# Patient Record
Sex: FEMALE | Race: WHITE | Employment: OTHER | URBAN - METROPOLITAN AREA
[De-identification: names, ages, dates, MRNs, and addresses within clinical notes are randomized per-mention and may not be internally consistent; named-entity substitution may affect disease eponyms.]

---

## 2017-07-03 ENCOUNTER — HOSPITAL ENCOUNTER (OUTPATIENT)
Dept: PULMONOLOGY | Age: 72
Discharge: HOME | End: 2017-07-03
Attending: FAMILY MEDICINE | Admitting: FAMILY MEDICINE

## 2017-07-10 PROBLEM — I27.20 MILD PULMONARY HYPERTENSION (HCC): Status: ACTIVE | Noted: 2017-07-10

## 2017-07-18 ENCOUNTER — HOSPITAL ENCOUNTER (OUTPATIENT)
Dept: LAB | Age: 72
Discharge: HOME | End: 2017-07-18
Attending: INTERNAL MEDICINE | Admitting: INTERNAL MEDICINE

## 2017-07-18 LAB
ANION GAP SERPL CALCULATED.3IONS-SCNC: 14 MMOL/L (ref 4–16)
APTT: 34.4 SECONDS (ref 21.2–33)
BASOPHILS ABSOLUTE: 0.1 K/CU MM
BASOPHILS RELATIVE PERCENT: 1.5 % (ref 0–1)
BUN BLDV-MCNC: 9 MG/DL (ref 6–23)
CALCIUM SERPL-MCNC: 9.5 MG/DL (ref 8.3–10.6)
CHLORIDE BLD-SCNC: 102 MMOL/L (ref 99–110)
CO2: 27 MMOL/L (ref 21–32)
CREAT SERPL-MCNC: 1 MG/DL (ref 0.6–1.1)
DIFFERENTIAL TYPE: ABNORMAL
EOSINOPHILS ABSOLUTE: 0.2 K/CU MM
EOSINOPHILS RELATIVE PERCENT: 3 % (ref 0–3)
GFR AFRICAN AMERICAN: >60 ML/MIN/1.73M2
GFR NON-AFRICAN AMERICAN: 55 ML/MIN/1.73M2
GLUCOSE BLD-MCNC: 180 MG/DL (ref 70–140)
HCT VFR BLD CALC: 41.6 % (ref 37–47)
HEMOGLOBIN: 14.4 GM/DL (ref 12.5–16)
IMMATURE NEUTROPHIL %: 0.9 % (ref 0–0.43)
INR BLD: 1.35 INDEX
LYMPHOCYTES ABSOLUTE: 1.6 K/CU MM
LYMPHOCYTES RELATIVE PERCENT: 19.3 % (ref 24–44)
MCH RBC QN AUTO: 32 PG (ref 27–31)
MCHC RBC AUTO-ENTMCNC: 34.6 % (ref 32–36)
MCV RBC AUTO: 92.4 FL (ref 78–100)
MONOCYTES ABSOLUTE: 0.6 K/CU MM
MONOCYTES RELATIVE PERCENT: 7.3 % (ref 0–4)
NUCLEATED RBC %: 0 %
PDW BLD-RTO: 12.9 % (ref 11.7–14.9)
PLATELET # BLD: 211 K/CU MM (ref 140–440)
PMV BLD AUTO: 9.7 FL (ref 7.5–11.1)
POTASSIUM SERPL-SCNC: 3.4 MMOL/L (ref 3.5–5.1)
PROTHROMBIN TIME: 15.5 SECONDS (ref 9.12–12.5)
RBC # BLD: 4.5 M/CU MM (ref 4.2–5.4)
SEGMENTED NEUTROPHILS ABSOLUTE COUNT: 5.5 K/CU MM
SEGMENTED NEUTROPHILS RELATIVE PERCENT: 68 % (ref 36–66)
SODIUM BLD-SCNC: 143 MMOL/L (ref 135–145)
TOTAL IMMATURE NEUTOROPHIL: 0.07 K/CU MM
TOTAL NUCLEATED RBC: 0 K/CU MM
WBC # BLD: 8 K/CU MM (ref 4–10.5)

## 2017-08-01 ENCOUNTER — HOSPITAL ENCOUNTER (OUTPATIENT)
Dept: OTHER | Age: 72
Discharge: HOME | End: 2017-08-01
Attending: INTERNAL MEDICINE | Admitting: INTERNAL MEDICINE

## 2017-08-04 LAB
ANION GAP SERPL CALCULATED.3IONS-SCNC: 12 MMOL/L (ref 4–16)
BUN BLDV-MCNC: 9 MG/DL (ref 6–23)
CALCIUM SERPL-MCNC: 8.9 MG/DL (ref 8.3–10.6)
CHLORIDE BLD-SCNC: 97 MMOL/L (ref 99–110)
CO2: 32 MMOL/L (ref 21–32)
CREAT SERPL-MCNC: 0.8 MG/DL (ref 0.6–1.1)
GFR AFRICAN AMERICAN: >60 ML/MIN/1.73M2
GFR NON-AFRICAN AMERICAN: >60 ML/MIN/1.73M2
GLUCOSE BLD-MCNC: 97 MG/DL (ref 70–140)
HCT VFR BLD CALC: 32.1 % (ref 37–47)
HEMOGLOBIN: 10.3 GM/DL (ref 12.5–16)
MCH RBC QN AUTO: 31.1 PG (ref 27–31)
MCHC RBC AUTO-ENTMCNC: 32.1 % (ref 32–36)
MCV RBC AUTO: 97 FL (ref 78–100)
PDW BLD-RTO: 14 % (ref 11.7–14.9)
PLATELET # BLD: 301 K/CU MM (ref 140–440)
PMV BLD AUTO: 9.7 FL (ref 7.5–11.1)
POTASSIUM SERPL-SCNC: 3.2 MMOL/L (ref 3.5–5.1)
PRO-BNP: 2642 PG/ML
RBC # BLD: 3.31 M/CU MM (ref 4.2–5.4)
SODIUM BLD-SCNC: 141 MMOL/L (ref 135–145)
TSH HIGH SENSITIVITY: 0.92 UIU/ML (ref 0.27–4.2)
WBC # BLD: 9 K/CU MM (ref 4–10.5)

## 2017-08-08 LAB
ANION GAP SERPL CALCULATED.3IONS-SCNC: 14 MMOL/L (ref 4–16)
BUN BLDV-MCNC: 6 MG/DL (ref 6–23)
CALCIUM SERPL-MCNC: 9 MG/DL (ref 8.3–10.6)
CHLORIDE BLD-SCNC: 98 MMOL/L (ref 99–110)
CO2: 28 MMOL/L (ref 21–32)
CREAT SERPL-MCNC: 0.8 MG/DL (ref 0.6–1.1)
GFR AFRICAN AMERICAN: >60 ML/MIN/1.73M2
GFR NON-AFRICAN AMERICAN: >60 ML/MIN/1.73M2
GLUCOSE BLD-MCNC: 112 MG/DL (ref 70–140)
POTASSIUM SERPL-SCNC: 3.8 MMOL/L (ref 3.5–5.1)
SODIUM BLD-SCNC: 140 MMOL/L (ref 135–145)

## 2017-08-11 LAB
ANION GAP SERPL CALCULATED.3IONS-SCNC: 15 MMOL/L (ref 4–16)
BUN BLDV-MCNC: 6 MG/DL (ref 6–23)
CALCIUM SERPL-MCNC: 8.6 MG/DL (ref 8.3–10.6)
CHLORIDE BLD-SCNC: 100 MMOL/L (ref 99–110)
CO2: 26 MMOL/L (ref 21–32)
CREAT SERPL-MCNC: 0.8 MG/DL (ref 0.6–1.1)
GFR AFRICAN AMERICAN: >60 ML/MIN/1.73M2
GFR NON-AFRICAN AMERICAN: >60 ML/MIN/1.73M2
GLUCOSE BLD-MCNC: 75 MG/DL (ref 70–140)
POTASSIUM SERPL-SCNC: 3.4 MMOL/L (ref 3.5–5.1)
PRO-BNP: 3081 PG/ML
SODIUM BLD-SCNC: 141 MMOL/L (ref 135–145)

## 2017-08-15 LAB
ANION GAP SERPL CALCULATED.3IONS-SCNC: 12 MMOL/L (ref 4–16)
BUN BLDV-MCNC: 8 MG/DL (ref 6–23)
CALCIUM SERPL-MCNC: 8.9 MG/DL (ref 8.3–10.6)
CHLORIDE BLD-SCNC: 98 MMOL/L (ref 99–110)
CO2: 30 MMOL/L (ref 21–32)
CREAT SERPL-MCNC: 0.9 MG/DL (ref 0.6–1.1)
GFR AFRICAN AMERICAN: >60 ML/MIN/1.73M2
GFR NON-AFRICAN AMERICAN: >60 ML/MIN/1.73M2
GLUCOSE BLD-MCNC: 65 MG/DL (ref 70–140)
HCT VFR BLD CALC: 34.2 % (ref 37–47)
HEMOGLOBIN: 10.9 GM/DL (ref 12.5–16)
MCH RBC QN AUTO: 30.4 PG (ref 27–31)
MCHC RBC AUTO-ENTMCNC: 31.9 % (ref 32–36)
MCV RBC AUTO: 95.3 FL (ref 78–100)
PDW BLD-RTO: 13.8 % (ref 11.7–14.9)
PLATELET # BLD: 225 K/CU MM (ref 140–440)
PMV BLD AUTO: 10 FL (ref 7.5–11.1)
POTASSIUM SERPL-SCNC: 3.8 MMOL/L (ref 3.5–5.1)
RBC # BLD: 3.59 M/CU MM (ref 4.2–5.4)
SODIUM BLD-SCNC: 140 MMOL/L (ref 135–145)
WBC # BLD: 6.2 K/CU MM (ref 4–10.5)

## 2017-08-17 PROBLEM — I25.118 CORONARY ARTERY DISEASE OF NATIVE ARTERY OF NATIVE HEART WITH STABLE ANGINA PECTORIS (HCC): Status: ACTIVE | Noted: 2017-08-17

## 2017-08-17 PROBLEM — I34.0 NON-RHEUMATIC MITRAL REGURGITATION: Status: ACTIVE | Noted: 2017-08-17

## 2017-08-22 LAB
ANION GAP SERPL CALCULATED.3IONS-SCNC: 12 MMOL/L (ref 4–16)
BUN BLDV-MCNC: 10 MG/DL (ref 6–23)
CALCIUM SERPL-MCNC: 8.8 MG/DL (ref 8.3–10.6)
CHLORIDE BLD-SCNC: 98 MMOL/L (ref 99–110)
CO2: 27 MMOL/L (ref 21–32)
CREAT SERPL-MCNC: 0.9 MG/DL (ref 0.6–1.1)
GFR AFRICAN AMERICAN: >60 ML/MIN/1.73M2
GFR NON-AFRICAN AMERICAN: >60 ML/MIN/1.73M2
GLUCOSE BLD-MCNC: 83 MG/DL (ref 70–140)
POTASSIUM SERPL-SCNC: 4 MMOL/L (ref 3.5–5.1)
PRO-BNP: 1588 PG/ML
SODIUM BLD-SCNC: 137 MMOL/L (ref 135–145)

## 2017-09-07 ENCOUNTER — HOSPITAL ENCOUNTER (OUTPATIENT)
Dept: GENERAL RADIOLOGY | Age: 72
Discharge: HOME | End: 2017-09-07
Attending: SURGERY | Admitting: SURGERY

## 2017-09-07 DIAGNOSIS — I50.42 CHRONIC COMBINED SYSTOLIC AND DIASTOLIC CONGESTIVE HEART FAILURE (HCC): ICD-10-CM

## 2017-09-25 ENCOUNTER — HOSPITAL ENCOUNTER (OUTPATIENT)
Dept: GENERAL RADIOLOGY | Age: 72
Discharge: HOME | End: 2017-09-25
Attending: INTERNAL MEDICINE | Admitting: INTERNAL MEDICINE

## 2017-09-25 LAB
ANION GAP SERPL CALCULATED.3IONS-SCNC: 18 MMOL/L (ref 4–16)
BACTERIA: ABNORMAL /HPF
BILIRUBIN URINE: NEGATIVE MG/DL
BLOOD, URINE: ABNORMAL
BUN BLDV-MCNC: 9 MG/DL (ref 6–23)
CALCIUM OXALATE CRYSTALS: ABNORMAL /HPF
CALCIUM SERPL-MCNC: 9.6 MG/DL (ref 8.3–10.6)
CHLORIDE BLD-SCNC: 96 MMOL/L (ref 99–110)
CLARITY: ABNORMAL
CO2: 27 MMOL/L (ref 21–32)
COLOR: ABNORMAL
CREAT SERPL-MCNC: 1.1 MG/DL (ref 0.6–1.1)
GFR AFRICAN AMERICAN: 59 ML/MIN/1.73M2
GFR NON-AFRICAN AMERICAN: 49 ML/MIN/1.73M2
GLUCOSE BLD-MCNC: 108 MG/DL (ref 70–140)
GLUCOSE, URINE: NEGATIVE MG/DL
HYALINE CASTS: >20 /LPF
KETONES, URINE: NEGATIVE MG/DL
LEUKOCYTE ESTERASE, URINE: ABNORMAL
MAGNESIUM: 1.8 MG/DL (ref 1.8–2.4)
MUCUS: ABNORMAL HPF
NITRITE URINE, QUANTITATIVE: NEGATIVE
PH, URINE: 5 (ref 5–8)
POTASSIUM SERPL-SCNC: 3.6 MMOL/L (ref 3.5–5.1)
PROTEIN UA: 30 MG/DL
RBC URINE: 5 /HPF (ref 0–6)
SODIUM BLD-SCNC: 141 MMOL/L (ref 135–145)
SPECIFIC GRAVITY UA: 1.02 (ref 1–1.03)
SQUAMOUS EPITHELIAL: 1 /HPF
TRANSITIONAL EPITHELIAL: <1 /HPF
TRICHOMONAS: ABNORMAL /HPF
UROBILINOGEN, URINE: NORMAL MG/DL (ref 0.2–1)
WBC CLUMP: ABNORMAL /HPF
WBC UA: 68 /HPF (ref 0–5)

## 2017-10-06 ENCOUNTER — HOSPITAL ENCOUNTER (OUTPATIENT)
Dept: GENERAL RADIOLOGY | Age: 72
Discharge: HOME | End: 2017-10-06
Attending: SURGERY | Admitting: SURGERY

## 2017-10-06 DIAGNOSIS — J90 PLEURAL EFFUSION: ICD-10-CM

## 2017-10-30 ENCOUNTER — HOSPITAL ENCOUNTER (OUTPATIENT)
Dept: GENERAL RADIOLOGY | Age: 72
Discharge: HOME | End: 2017-10-30
Attending: THORACIC SURGERY (CARDIOTHORACIC VASCULAR SURGERY) | Admitting: THORACIC SURGERY (CARDIOTHORACIC VASCULAR SURGERY)

## 2017-10-30 DIAGNOSIS — J44.9 COPD, MODERATE (HCC): ICD-10-CM

## 2017-11-15 PROBLEM — K74.69 OTHER CIRRHOSIS OF LIVER (HCC): Status: ACTIVE | Noted: 2017-11-15

## 2017-11-21 ENCOUNTER — TELEPHONE (OUTPATIENT)
Dept: GASTROENTEROLOGY | Age: 72
End: 2017-11-21

## 2017-11-21 NOTE — TELEPHONE ENCOUNTER
Received phone call from Dr Bj Arango requesting that we cancel her EGD that was ordered specifically to check for varices as CT had suggested cirrhosis.  Will cancel that for now and he will call back to reschedule when and if appropriate

## 2017-11-27 ENCOUNTER — HOSPITAL ENCOUNTER (OUTPATIENT)
Dept: GENERAL RADIOLOGY | Age: 72
Discharge: HOME | End: 2017-11-27
Attending: FAMILY MEDICINE | Admitting: FAMILY MEDICINE

## 2017-11-27 ENCOUNTER — HOSPITAL ENCOUNTER (OUTPATIENT)
Dept: GENERAL RADIOLOGY | Age: 72
Discharge: HOME | End: 2017-11-27
Attending: SURGERY | Admitting: SURGERY

## 2017-11-27 DIAGNOSIS — J90 PLEURAL EFFUSION: ICD-10-CM

## 2017-11-27 LAB
ALBUMIN SERPL-MCNC: 3.5 GM/DL (ref 3.4–5)
ALP BLD-CCNC: 94 IU/L (ref 40–128)
ALT SERPL-CCNC: 15 U/L (ref 10–40)
ANION GAP SERPL CALCULATED.3IONS-SCNC: 13 MMOL/L (ref 4–16)
AST SERPL-CCNC: 14 IU/L (ref 15–37)
BASOPHILS ABSOLUTE: 0.1 K/CU MM
BASOPHILS RELATIVE PERCENT: 0.5 % (ref 0–1)
BILIRUB SERPL-MCNC: 1.4 MG/DL (ref 0–1)
BUN BLDV-MCNC: 9 MG/DL (ref 6–23)
CALCIUM SERPL-MCNC: 9.2 MG/DL (ref 8.3–10.6)
CHLORIDE BLD-SCNC: 100 MMOL/L (ref 99–110)
CO2: 27 MMOL/L (ref 21–32)
CREAT SERPL-MCNC: 0.9 MG/DL (ref 0.6–1.1)
DIFFERENTIAL TYPE: ABNORMAL
EOSINOPHILS ABSOLUTE: 0.1 K/CU MM
EOSINOPHILS RELATIVE PERCENT: 0.6 % (ref 0–3)
GFR AFRICAN AMERICAN: >60 ML/MIN/1.73M2
GFR NON-AFRICAN AMERICAN: >60 ML/MIN/1.73M2
GLUCOSE BLD-MCNC: 167 MG/DL (ref 70–99)
HCT VFR BLD CALC: 42.3 % (ref 37–47)
HEMOGLOBIN: 13.8 GM/DL (ref 12.5–16)
IMMATURE NEUTROPHIL %: 2.1 % (ref 0–0.43)
LYMPHOCYTES ABSOLUTE: 1.2 K/CU MM
LYMPHOCYTES RELATIVE PERCENT: 9.5 % (ref 24–44)
MCH RBC QN AUTO: 29.3 PG (ref 27–31)
MCHC RBC AUTO-ENTMCNC: 32.6 % (ref 32–36)
MCV RBC AUTO: 89.8 FL (ref 78–100)
MONOCYTES ABSOLUTE: 0.6 K/CU MM
MONOCYTES RELATIVE PERCENT: 4.3 % (ref 0–4)
NUCLEATED RBC %: 0 %
PDW BLD-RTO: 16.3 % (ref 11.7–14.9)
PLATELET # BLD: 219 K/CU MM (ref 140–440)
PMV BLD AUTO: 10.2 FL (ref 7.5–11.1)
POTASSIUM SERPL-SCNC: 3.6 MMOL/L (ref 3.5–5.1)
RBC # BLD: 4.71 M/CU MM (ref 4.2–5.4)
SEGMENTED NEUTROPHILS ABSOLUTE COUNT: 10.8 K/CU MM
SEGMENTED NEUTROPHILS RELATIVE PERCENT: 83 % (ref 36–66)
SODIUM BLD-SCNC: 140 MMOL/L (ref 135–145)
TOTAL IMMATURE NEUTOROPHIL: 0.27 K/CU MM
TOTAL NUCLEATED RBC: 0 K/CU MM
TOTAL PROTEIN: 5.7 GM/DL (ref 6.4–8.2)
WBC # BLD: 13 K/CU MM (ref 4–10.5)

## 2017-12-01 NOTE — ANESTHESIA PRE-OP
artery, which she claims has for longtime  Followed by Dr. Stiven Baldwin:  neg    BREAST/GYN:  Neg     HEMATOLOGIC/LYMPHATIC: neg    ENDOCRINE: DM, last A1c 5.1, 10/2017. Thyroid dz, on replacement. MUSCULOSKELETAL: gout on colchicine    NEUROLOGICAL:  Hypoglycemic seizure x 1, 2000's. BEHAVIOR/PSYCH:   Depression and anxiety  Alert and oriented x 4. Questions answered. Understanding verbalized    PHYSICAL EXAM:  Airway Exam:  Head/Neck movement: full  Thyromental Distance: > 3 FB  History of difficult intubation:  None  Mallampati Classification:  Class II  Teeth: upper dentures. 5 lower, loose two left front. LUNGS:  Diminished BS bilaterally, no crackles or wheezing  CARDIOVASCULAR:  Normal apical impulse, regular rate and rhythm, normal S1 and S2, no S3 or S4, and no murmur noted    Testing Results:    EKG:  Atrial fibrillation, controlled rate 71  Nonspecific T wave abnormality   Abnormal ECG   When compared with ECG of 11-NOV-2017 09:13,   Vent. rate has decreased BY  38 BPM   QT has lengthened     LABS:    CBC  Lab Results   Component Value Date/Time    WBC 6.9 12/04/2017 10:00 AM    HGB 12.2 (L) 12/04/2017 10:00 AM    HCT 38.0 12/04/2017 10:00 AM     12/04/2017 10:00 AM     RENAL  Lab Results   Component Value Date/Time     (L) 12/04/2017 10:00 AM    K 3.6 12/04/2017 10:00 AM    CL 95 (L) 12/04/2017 10:00 AM    CO2 29 12/04/2017 10:00 AM    BUN 7 12/04/2017 10:00 AM    CREATININE 0.9 12/04/2017 10:00 AM    GLUCOSE 204 (H) 12/04/2017 10:00 AM     COAGS  Lab Results   Component Value Date/Time    PROTIME 12.8 (H) 12/04/2017 10:00 AM    INR 1.12 12/04/2017 10:00 AM    APTT 33.6 (H) 12/04/2017 10:00 AM     Summary:  Chart reviewed, pt. Interviewed and examined. Planned surgical procedure:  Left  VATS pleurodesis both mechanical and talc per Dr. Nino Bauer. 1.  Controlled  HTN, HLD, AAA repair 2002,  VHD, CAD s/p CABG x 1 with MV repair 7/2017. PAF. EKG: controlled rate AF.  On beta blocker. On ASA and Eliquis, last dose 12/2/2017.     2.  Post CABG SOB s/p multiple left pleural effusion with thoracentesis x 4 per IR. Last 11/14/2017. Planned left  VAT. COPD, Med neb treatments as needed, inhaler x 1. Last exacerbation 11/14/2017. Followed by Dr Ziggy Baum. Former smoker, hx 1ppd x ~40 years. Able to lie flat. CXR:Decrease in size of a now small to moderate left pleural effusion with overlying atelectasis. 3.  Reflux, s/p sergio, 1998. Poss cirrohosis evaluation per Dr. Slava Larose. 4.  Calcified cyst in the left renal artery. Followed by Dr. Mariana Simmons. 5.  DM, last A1c 5.1, 10/2017. Hx Hypoglycemic seizure x 1, 2000's. Random BS today: 204.     6.  Thyroid dz, on replacement. 7.  HGB: 12.2. HCT: 38.0.    8. Hx gout , on colchicine. Labs, imaging, and EKG per surgeon. Results to surgeon via Cardiac Educator RN. Attending cardiac education class today. Anesthesia Evaluation will follow by a certified practitioner prior to surgery.     Electronically signed by Clevester Romberg, NP on 12/1/2017 at @

## 2017-12-04 ENCOUNTER — HOSPITAL ENCOUNTER (OUTPATIENT)
Dept: PREADMISSION TESTING | Age: 72
Discharge: HOME | End: 2017-12-04
Attending: THORACIC SURGERY (CARDIOTHORACIC VASCULAR SURGERY) | Admitting: THORACIC SURGERY (CARDIOTHORACIC VASCULAR SURGERY)

## 2017-12-04 VITALS
OXYGEN SATURATION: 92 % | HEIGHT: 66 IN | HEART RATE: 69 BPM | SYSTOLIC BLOOD PRESSURE: 112 MMHG | RESPIRATION RATE: 18 BRPM | DIASTOLIC BLOOD PRESSURE: 56 MMHG | BODY MASS INDEX: 29.25 KG/M2 | WEIGHT: 182 LBS | TEMPERATURE: 98.1 F

## 2017-12-04 LAB
ANION GAP SERPL CALCULATED.3IONS-SCNC: 10 MMOL/L (ref 4–16)
APTT: 33.6 SECONDS (ref 21.2–33)
BACTERIA: NEGATIVE /HPF
BASOPHILS ABSOLUTE: 0.1 K/CU MM
BASOPHILS RELATIVE PERCENT: 1.2 % (ref 0–1)
BILIRUBIN URINE: NEGATIVE MG/DL
BLOOD, URINE: NEGATIVE
BUN BLDV-MCNC: 7 MG/DL (ref 6–23)
CHLORIDE BLD-SCNC: 95 MMOL/L (ref 99–110)
CLARITY: CLEAR
CO2: 29 MMOL/L (ref 21–32)
COLOR: ABNORMAL
CREAT SERPL-MCNC: 0.9 MG/DL (ref 0.6–1.1)
DIFFERENTIAL TYPE: ABNORMAL
EKG ATRIAL RATE: 357 BPM
EKG DIAGNOSIS: NORMAL
EKG Q-T INTERVAL: 410 MS
EKG QRS DURATION: 84 MS
EKG QTC CALCULATION (BAZETT): 445 MS
EKG R AXIS: 38 DEGREES
EKG T AXIS: 62 DEGREES
EKG VENTRICULAR RATE: 71 BPM
EOSINOPHILS ABSOLUTE: 0.2 K/CU MM
EOSINOPHILS RELATIVE PERCENT: 2.3 % (ref 0–3)
GFR AFRICAN AMERICAN: >60 ML/MIN/1.73M2
GFR NON-AFRICAN AMERICAN: >60 ML/MIN/1.73M2
GLUCOSE BLD-MCNC: 204 MG/DL (ref 70–99)
GLUCOSE, URINE: NEGATIVE MG/DL
HCT VFR BLD CALC: 38 % (ref 37–47)
HEMOGLOBIN: 12.2 GM/DL (ref 12.5–16)
IMMATURE NEUTROPHIL %: 1 % (ref 0–0.43)
INR BLD: 1.12 INDEX
KETONES, URINE: NEGATIVE MG/DL
LEUKOCYTE ESTERASE, URINE: NEGATIVE
LYMPHOCYTES ABSOLUTE: 1.1 K/CU MM
LYMPHOCYTES RELATIVE PERCENT: 15.2 % (ref 24–44)
MCH RBC QN AUTO: 29 PG (ref 27–31)
MCHC RBC AUTO-ENTMCNC: 32.1 % (ref 32–36)
MCV RBC AUTO: 90.3 FL (ref 78–100)
MONOCYTES ABSOLUTE: 0.5 K/CU MM
MONOCYTES RELATIVE PERCENT: 7.2 % (ref 0–4)
MUCUS: ABNORMAL HPF
NITRITE URINE, QUANTITATIVE: NEGATIVE
NUCLEATED RBC %: 0 %
PDW BLD-RTO: 15.4 % (ref 11.7–14.9)
PH, URINE: 5 (ref 5–8)
PLATELET # BLD: 200 K/CU MM (ref 140–440)
PMV BLD AUTO: 9.9 FL (ref 7.5–11.1)
POTASSIUM SERPL-SCNC: 3.6 MMOL/L (ref 3.5–5.1)
PROTEIN UA: 30 MG/DL
PROTHROMBIN TIME: 12.8 SECONDS (ref 9.12–12.5)
RBC # BLD: 4.21 M/CU MM (ref 4.2–5.4)
RBC URINE: ABNORMAL /HPF (ref 0–6)
SEGMENTED NEUTROPHILS ABSOLUTE COUNT: 5.1 K/CU MM
SEGMENTED NEUTROPHILS RELATIVE PERCENT: 73.1 % (ref 36–66)
SODIUM BLD-SCNC: 134 MMOL/L (ref 135–145)
SPECIFIC GRAVITY UA: 1.02 (ref 1–1.03)
SQUAMOUS EPITHELIAL: 1 /HPF
TOTAL IMMATURE NEUTOROPHIL: 0.07 K/CU MM
TOTAL NUCLEATED RBC: 0 K/CU MM
TRANSITIONAL EPITHELIAL: <1 /HPF
TRICHOMONAS: ABNORMAL /HPF
UROBILINOGEN, URINE: 2 MG/DL (ref 0.2–1)
WBC # BLD: 6.9 K/CU MM (ref 4–10.5)
WBC CLUMP: ABNORMAL /HPF
WBC UA: 5 /HPF (ref 0–5)

## 2017-12-04 RX ORDER — FUROSEMIDE 40 MG/1
40 TABLET ORAL EVERY OTHER DAY
Status: ON HOLD | COMMUNITY
End: 2018-02-06 | Stop reason: HOSPADM

## 2017-12-04 RX ORDER — VANCOMYCIN HYDROCHLORIDE 1 G/200ML
1000 INJECTION, SOLUTION INTRAVENOUS ONCE
Status: CANCELLED | OUTPATIENT
Start: 2017-12-07

## 2017-12-04 RX ORDER — GLIMEPIRIDE 2 MG/1
1 TABLET ORAL
COMMUNITY

## 2017-12-07 PROBLEM — J90 PLEURAL EFFUSION: Status: ACTIVE | Noted: 2017-12-07

## 2017-12-14 PROBLEM — I48.91 ATRIAL FIBRILLATION (HCC): Status: ACTIVE | Noted: 2017-12-14

## 2017-12-14 PROBLEM — I48.19 PERSISTENT ATRIAL FIBRILLATION (HCC): Status: ACTIVE | Noted: 2017-12-14

## 2017-12-18 ENCOUNTER — HOSPITAL ENCOUNTER (OUTPATIENT)
Dept: GENERAL RADIOLOGY | Age: 72
Discharge: HOME | End: 2017-12-18
Attending: PHYSICIAN ASSISTANT | Admitting: PHYSICIAN ASSISTANT

## 2017-12-18 DIAGNOSIS — J90 PLEURAL EFFUSION: ICD-10-CM

## 2017-12-25 PROBLEM — R56.9 SEIZURE WITH ONSET OF STROKE (HCC): Status: ACTIVE | Noted: 2017-12-25

## 2017-12-25 PROBLEM — I63.9 SEIZURE WITH ONSET OF STROKE (HCC): Status: ACTIVE | Noted: 2017-12-25

## 2017-12-27 ENCOUNTER — TELEPHONE (OUTPATIENT)
Dept: CARDIOLOGY CLINIC | Age: 72
End: 2017-12-27

## 2017-12-29 ENCOUNTER — TELEPHONE (OUTPATIENT)
Dept: CARDIOLOGY CLINIC | Age: 72
End: 2017-12-29

## 2017-12-29 NOTE — TELEPHONE ENCOUNTER
Spoke with patient advised her that Dr Nanci Helms wants a 3 week follow up from [facility]. Patient states she has appointment with Dr Ata Gong on 1/10/18 and wants to wait until she sees him. She is very upset and confused, she don't know if something is wrong with her heart, if she had a stroke or seizure. Not one doctor has told her the same thing, she is very upset and don't want to schedule with Dr Nanci Helms at this time. I advised her we will wait until she sees Dr Ata Gong and we'll go from there. She voiced understanding and gratitude.

## 2018-01-02 ENCOUNTER — TELEPHONE (OUTPATIENT)
Dept: CARDIOLOGY CLINIC | Age: 73
End: 2018-01-02

## 2018-01-02 LAB
AVERAGE GLUCOSE: NORMAL
CREATININE: 5.9 MG/DL
HBA1C MFR BLD: 5.4 %
INR BLD: 1.1
PROTIME: 11.3 SECONDS

## 2018-01-05 ENCOUNTER — TELEPHONE (OUTPATIENT)
Dept: CARDIOLOGY CLINIC | Age: 73
End: 2018-01-05

## 2018-01-05 ENCOUNTER — OFFICE VISIT (OUTPATIENT)
Dept: CARDIOLOGY CLINIC | Age: 73
End: 2018-01-05

## 2018-01-05 VITALS
WEIGHT: 172.8 LBS | DIASTOLIC BLOOD PRESSURE: 80 MMHG | HEART RATE: 106 BPM | BODY MASS INDEX: 27.77 KG/M2 | HEIGHT: 66 IN | SYSTOLIC BLOOD PRESSURE: 120 MMHG

## 2018-01-05 DIAGNOSIS — I48.19 PERSISTENT ATRIAL FIBRILLATION (HCC): Primary | ICD-10-CM

## 2018-01-05 DIAGNOSIS — I48.91 ATRIAL FIBRILLATION, UNSPECIFIED TYPE (HCC): ICD-10-CM

## 2018-01-05 PROCEDURE — G8598 ASA/ANTIPLAT THER USED: HCPCS | Performed by: INTERNAL MEDICINE

## 2018-01-05 PROCEDURE — 1111F DSCHRG MED/CURRENT MED MERGE: CPT | Performed by: INTERNAL MEDICINE

## 2018-01-05 PROCEDURE — 99214 OFFICE O/P EST MOD 30 MIN: CPT | Performed by: INTERNAL MEDICINE

## 2018-01-05 PROCEDURE — G8417 CALC BMI ABV UP PARAM F/U: HCPCS | Performed by: INTERNAL MEDICINE

## 2018-01-05 PROCEDURE — 1036F TOBACCO NON-USER: CPT | Performed by: INTERNAL MEDICINE

## 2018-01-05 PROCEDURE — 1090F PRES/ABSN URINE INCON ASSESS: CPT | Performed by: INTERNAL MEDICINE

## 2018-01-05 PROCEDURE — G8484 FLU IMMUNIZE NO ADMIN: HCPCS | Performed by: INTERNAL MEDICINE

## 2018-01-05 PROCEDURE — G8427 DOCREV CUR MEDS BY ELIG CLIN: HCPCS | Performed by: INTERNAL MEDICINE

## 2018-01-05 PROCEDURE — 4040F PNEUMOC VAC/ADMIN/RCVD: CPT | Performed by: INTERNAL MEDICINE

## 2018-01-05 PROCEDURE — 1123F ACP DISCUSS/DSCN MKR DOCD: CPT | Performed by: INTERNAL MEDICINE

## 2018-01-05 PROCEDURE — 93000 ELECTROCARDIOGRAM COMPLETE: CPT | Performed by: INTERNAL MEDICINE

## 2018-01-05 PROCEDURE — 3014F SCREEN MAMMO DOC REV: CPT | Performed by: INTERNAL MEDICINE

## 2018-01-05 PROCEDURE — G8400 PT W/DXA NO RESULTS DOC: HCPCS | Performed by: INTERNAL MEDICINE

## 2018-01-05 PROCEDURE — 3017F COLORECTAL CA SCREEN DOC REV: CPT | Performed by: INTERNAL MEDICINE

## 2018-01-05 RX ORDER — LEVETIRACETAM 750 MG/1
TABLET ORAL
Refills: 2 | COMMUNITY
Start: 2017-12-28

## 2018-01-05 NOTE — LETTER
[facility]. In most cases, the medicine is put into your arm through a tube called an IV. But you may get medicines to take by mouth. You may feel a quick sting or pinch when the IV starts. The procedure usually takes about 4 to 8 hours. Transesophageal Echocardiogram  What is a transesophageal echocardiogram?  A transesophageal echocardiogram is a test to help your doctor look at the inside of your heart. A small device called a transducer directs sound waves toward your heart. The sound waves make a picture of the heart's valves and chambers. Your doctor may do this test to look for certain types of heart disease. Or it may be done to see how disease is affecting your heart. You will be given medicine to make you sleepy and comfortable during the test. The doctor puts a small, flexible tube into your throat and guides it to the esophagus. This is the tube that connects your mouth to your stomach. The doctor will ask you to swallow as the tube goes down. The transducer is at the tip of the tube. It gets close to your heart to make clear pictures. The doctor will look at the ultrasound pictures on a screen. You will not be able to eat or drink until the numbness from the throat spray wears off. Your throat may be sore for a few days after the test.  Follow-up care is a key part of your treatment and safety. Be sure to make and go to all appointments, and call your doctor if you are having problems. It's also a good idea to know your test results and keep a list of the medicines you take. [address]/CARDIOLOGY        Patient Name: Phyllis Jacobson   : 1945   MRN# [id number]    Transesophageal Echocardiogram with Cardioversion    ? ANTICOUGLATION:  Hold Eliquis evening dose the night before the procedure and the morning dose of the procedure    Day of Procedure Cath Lab Holding Area Preop Orders:    ?  YOU HAVE MY PERMISSION TO TALK TO THE PATIENT-PLEASE ? Anesthesia    ? ALEXANDRU with Anesthesia    ? IV peripheral saline lock  (preferred left arm). ? STAT LABS ON ARRIVAL: BMP, MAG, PHOS, CBC, PT INR, PTT    ? If taking Coumadin, PT INR  STAT on arrival day of procedure. ? 12 lead EKG STAT on arrival day of procedure. ? Diabetic patients >> Accu check Blood sugar check. ? Document home medications in EPIC and include date and time of last dose.    ? NPO.    ? Notify Dr. Spenser Singleton of abnormal lab results. ? Chest Prep> Clip hair anterior chest and posterior back. Physician Signature:________________________________Date:_____________                              [facility]) Informed Consent for Anesthesia/Sedation, Surgery, Invasive Procedures, and other High-risk Interventions and Medication use     *This consent is applicable for 30 days following patient signature*    Procedure(s)   I, Glynda Lesch authorize, Dr. Ty Rocha   and the associate(s) or assistant(s) of his/her choice, to perform the following procedure(s): Transesophageal Echocardiogram with Cardioversion    I know that unexpected conditions may require additional or different procedures than those above. I authorize the above named practitioner(s) perform these as necessary and desirable. This is based on the practitioners professional judgment. The above named practitioner has discussed the above procedure(s) with me, including:  ? Potential benefits, including likelihood of success of the procedure(s) goals  ? Risks  ? Side effects, risk of death, and risk of infection  ? Any potential problems that might occur during recuperation or healing post-procedure  ? Reasonable alternatives  ? Risks of NOT performing the procedure(s)    I acknowledge that no warranty or guarantee has been made to the results the procedure(s).      I consent to the above named practitioner(s) providing additional services 1) Call [facility] scheduling ([phone]) or [address] [facility]     PHONE OR   INSTANT MESSAGE  2) PREAUTHORIZATION NUMBER:    Spoke to:      From date:     expiration date:          Kermit Pineda

## 2018-01-05 NOTE — PROGRESS NOTES
Patient here in office and educated on Dev/Cardioversion w/anesthesia, schedule for 1/22/18 @ 12:30, with arrival @ 10:30, @ [address] [facility]; risk explained; and consents signed. Also copy of orders given for labs due STAT on arrival at [facility]. Instruction given to patient to :  NPO after midnight the night before procedure; call hospital at [phone] to pre-register. May take rest of morning meds of procedure. Hold Metformin the evening dose night before procedure and morning dose of procedure. Also hold Glimepiride 24 hours prior to procedure. Patient voiced understanding. Copies of consent & info sheet given to [facility] for scanning.

## 2018-01-05 NOTE — PATIENT INSTRUCTIONS
Please remember to bring all medication bottles or a medication list with you to your appointment. If you have any questions, please call our office at [phone].

## 2018-01-05 NOTE — PROGRESS NOTES
[facility] FU [facility]      Chief complaint : Atrial fibrillation and recent stroke    Referring physician:      Primary care physician: Juan Carlos Montgomery MD      History of Present Illness:     Chief Complaint   Patient presents with    Atrial Fibrillation     Pt is here for a follow up [facility] from a fib and stroke, Pt states sometimes she gets light headed. Pt states sometimes her heart races. Pt denies chest pain, and edema. Tiredness and weakness. Shortness of breath with exertion present. Past medical history:   Past Medical History:   Diagnosis Date    Anxiety     per old chart hx of panic attacks    Atrial fibrillation (HCC)     Atrial fibrillation with normal ventricular rate ([facility] [address] 11/2015    CAD (coronary artery disease)     follows with Dr Ty Lion, bilateral     COPD (chronic obstructive pulmonary disease) ([facility] [address]     follows with Dr Kartik Lawrence Depression     Diabetes mellitus [facility])     dx 1's per old chart    Hyperlipidemia     Hypertension     Pleural effusion     scheduled for thoracentesis 9/11/2017  has had fluid drained off lung 4 times since CABG    Seizure ([facility] [address]     \"had a diabetic seizure 16 hrs ago- from low sugar\"    Thyroid disease     Wears dentures     full  upper plate    Wears glasses        Surgical history :   Past Surgical History:   Procedure Laterality Date    ABDOMEN SURGERY      per old chart pt had exp. laparotomy and appy done 1966\"the appendix was wrapped around the colon\"   [address] CATHETERIZATION  07/20/2017    Dr. Melony Lackey. Diagnostic only, no intervention done.  CHOLECYSTECTOMY      1998    COLONOSCOPY  04/2010    CORONARY ARTERY BYPASS GRAFT  07/25/2017    Gil to LAD.  Done per Dr. Fede Vega with mitral valve repair     MITRAL VALVE SURGERY  07/25/2017    MV repair with ring     OTHER SURGICAL

## 2018-01-09 ENCOUNTER — TELEPHONE (OUTPATIENT)
Dept: CARDIOLOGY CLINIC | Age: 73
End: 2018-01-09

## 2018-01-10 PROBLEM — I48.91 ATRIAL FIBRILLATION STATUS POST CARDIOVERSION (HCC): Status: ACTIVE | Noted: 2018-01-10

## 2018-01-10 ASSESSMENT — ENCOUNTER SYMPTOMS
PHOTOPHOBIA: 0
SHORTNESS OF BREATH: 1
CONSTIPATION: 0
EYE PAIN: 0
ABDOMINAL PAIN: 0
COUGH: 0
VOMITING: 0
DIARRHEA: 0
NAUSEA: 0
COLOR CHANGE: 0
CHEST TIGHTNESS: 0
WHEEZING: 0
BACK PAIN: 0
BLOOD IN STOOL: 0

## 2018-01-19 ENCOUNTER — HOSPITAL ENCOUNTER (OUTPATIENT)
Dept: GENERAL RADIOLOGY | Age: 73
Discharge: HOME | End: 2018-01-19
Attending: THORACIC SURGERY (CARDIOTHORACIC VASCULAR SURGERY) | Admitting: THORACIC SURGERY (CARDIOTHORACIC VASCULAR SURGERY)

## 2018-01-19 ENCOUNTER — OFFICE VISIT (OUTPATIENT)
Dept: CARDIOLOGY CLINIC | Age: 73
End: 2018-01-19

## 2018-01-19 VITALS
DIASTOLIC BLOOD PRESSURE: 84 MMHG | HEART RATE: 88 BPM | BODY MASS INDEX: 27.9 KG/M2 | HEIGHT: 66 IN | SYSTOLIC BLOOD PRESSURE: 126 MMHG | WEIGHT: 173.6 LBS

## 2018-01-19 DIAGNOSIS — I48.19 PERSISTENT ATRIAL FIBRILLATION (HCC): ICD-10-CM

## 2018-01-19 DIAGNOSIS — I48.0 PAF (PAROXYSMAL ATRIAL FIBRILLATION) (HCC): Primary | ICD-10-CM

## 2018-01-19 DIAGNOSIS — J90 PLEURAL EFFUSION: ICD-10-CM

## 2018-01-19 PROCEDURE — G8417 CALC BMI ABV UP PARAM F/U: HCPCS | Performed by: INTERNAL MEDICINE

## 2018-01-19 PROCEDURE — 99212 OFFICE O/P EST SF 10 MIN: CPT | Performed by: INTERNAL MEDICINE

## 2018-01-19 PROCEDURE — G8427 DOCREV CUR MEDS BY ELIG CLIN: HCPCS | Performed by: INTERNAL MEDICINE

## 2018-01-19 PROCEDURE — G8598 ASA/ANTIPLAT THER USED: HCPCS | Performed by: INTERNAL MEDICINE

## 2018-01-19 PROCEDURE — 1111F DSCHRG MED/CURRENT MED MERGE: CPT | Performed by: INTERNAL MEDICINE

## 2018-01-19 PROCEDURE — 1123F ACP DISCUSS/DSCN MKR DOCD: CPT | Performed by: INTERNAL MEDICINE

## 2018-01-19 PROCEDURE — 3017F COLORECTAL CA SCREEN DOC REV: CPT | Performed by: INTERNAL MEDICINE

## 2018-01-19 PROCEDURE — 1090F PRES/ABSN URINE INCON ASSESS: CPT | Performed by: INTERNAL MEDICINE

## 2018-01-19 PROCEDURE — 3014F SCREEN MAMMO DOC REV: CPT | Performed by: INTERNAL MEDICINE

## 2018-01-19 PROCEDURE — 93000 ELECTROCARDIOGRAM COMPLETE: CPT | Performed by: INTERNAL MEDICINE

## 2018-01-19 PROCEDURE — G8484 FLU IMMUNIZE NO ADMIN: HCPCS | Performed by: INTERNAL MEDICINE

## 2018-01-19 PROCEDURE — G8400 PT W/DXA NO RESULTS DOC: HCPCS | Performed by: INTERNAL MEDICINE

## 2018-01-19 PROCEDURE — 1036F TOBACCO NON-USER: CPT | Performed by: INTERNAL MEDICINE

## 2018-01-19 PROCEDURE — 4040F PNEUMOC VAC/ADMIN/RCVD: CPT | Performed by: INTERNAL MEDICINE

## 2018-01-19 RX ORDER — DILTIAZEM HYDROCHLORIDE 60 MG/1
60 TABLET, FILM COATED ORAL 4 TIMES DAILY
Qty: 120 TABLET | Refills: 2 | Status: SHIPPED | OUTPATIENT
Start: 2018-01-19 | End: 2018-02-07 | Stop reason: HOSPADM

## 2018-01-19 RX ORDER — DILTIAZEM HYDROCHLORIDE 60 MG/1
60 TABLET, FILM COATED ORAL 4 TIMES DAILY
Qty: 120 TABLET | Refills: 0 | Status: CANCELLED | OUTPATIENT
Start: 2018-01-19

## 2018-01-19 NOTE — PROGRESS NOTES
by mouth every other day Alternate with 80mg every other day      glimepiride (AMARYL) 2 MG tablet Take 1 mg by mouth every morning (before breakfast)       spironolactone (ALDACTONE) 25 MG tablet TK 1 T PO  BID  0    furosemide (LASIX) 80 MG tablet Take 80 mg by mouth See Admin Instructions Alternate with the 40 mg every other day      aspirin 81 MG EC tablet Take 1 tablet by mouth daily 30 tablet 3    ipratropium-albuterol (DUONEB) 0.5-2.5 (3) MG/3ML SOLN nebulizer solution Take 1 vial by nebulization every 4 hours as needed       budesonide-formoterol (SYMBICORT) 160-4.5 MCG/ACT AERO Inhale 2 puffs into the lungs 2 times daily 1 Inhaler 5    atorvastatin (LIPITOR) 80 MG tablet Take 1 tablet by mouth nightly 30 tablet 3    potassium chloride (KLOR-CON M) 10 MEQ extended release tablet Take 1 tablet by mouth daily 60 tablet 3    linagliptin (TRADJENTA) 5 MG tablet Take 5 mg by mouth daily      LORazepam (ATIVAN) 1 MG tablet Take 1 mg by mouth every 8 hours as needed for Anxiety  Up to 4 a day.  levothyroxine (SYNTHROID) 137 MCG tablet Take 137 mcg by mouth Daily.  FLUoxetine (PROZAC) 20 MG capsule Take 20 mg by mouth daily          Review of Systems:   Review of Systems   Constitutional: tiredness improved but still present. Negative for chills and fever. HENT: Negative for congestion, ear pain and tinnitus. Eyes: Negative for photophobia, pain and visual disturbance. Respiratory: shortness of breath is improved. Negative for cough, chest tightness and wheezing. Cardiovascular: denies palpitations Negative for chest pain and leg swelling. Gastrointestinal: Negative for abdominal pain, blood in stool, constipation, diarrhea, nausea and vomiting. Endocrine: Negative for cold intolerance and heat intolerance. Genitourinary: Negative for dysuria, flank pain and hematuria. Musculoskeletal: Positive for arthralgias. Negative for back pain, myalgias and neck stiffness.    Skin: Negative for color change and rash. Allergic/Immunologic: Negative for food allergies. Neurological: denies dizziness. Negative for syncope, numbness and headaches. Hematological: Does not bruise/bleed easily. Psychiatric/Behavioral: Negative for agitation and confusion. Physical Examination:    /84   Pulse 88   Ht 5' 6\" (1.676 m)   Wt 173 lb 9.6 oz (78.7 kg)   LMP 01/08/2002   BMI 28.02 kg/m²    Wt Readings from Last 3 Encounters:   01/28/18 174 lb (78.9 kg)   01/22/18 171 lb (77.6 kg)   01/19/18 173 lb 9.6 oz (78.7 kg)     Body mass index is 28.02 kg/m². Physical Exam   Constitutional: She is oriented to person, place, and time and well-developed, well-nourished, and in no distress. HENT:   Head: Normocephalic and atraumatic. Eyes: Conjunctivae and EOM are normal. Pupils are equal, round, and reactive to light. Right eye exhibits no discharge. Neck: Normal range of motion. No JVD present. No thyromegaly present. Cardiovascular: Exam reveals no friction rub. Murmur (systolic murmur) heard. Irregular rhythm and rate   Pulmonary/Chest: Effort normal. No stridor. No respiratory distress. She has no wheezes. She has rales. Abdominal: Soft. Bowel sounds are normal. She exhibits no distension. There is no tenderness. Musculoskeletal: Normal range of motion. She exhibits no edema or tenderness. Neurological: She is alert and oriented to person, place, and time. No cranial nerve deficit. Skin: Skin is warm and dry. No rash noted. No erythema.    Psychiatric: Mood and affect normal.           CBC:   Lab Results   Component Value Date    WBC 10.3 01/28/2018    HGB 12.0 01/28/2018    HCT 37.1 01/28/2018     01/28/2018     Lipids:   Lab Results   Component Value Date    CHOL 157 12/26/2017    TRIG 170 (H) 12/26/2017    HDL 39 (L) 12/26/2017    LDLDIRECT 98 12/26/2017     PT/INR:   Lab Results   Component Value Date    INR 2.21 01/28/2018        BMP:    Lab Results Component Value Date     01/28/2018    K 3.9 01/28/2018    CL 99 01/28/2018    CO2 25 01/28/2018    BUN 13 01/28/2018     CMP:   Lab Results   Component Value Date    AST 31 01/28/2018    PROT 6.6 01/28/2018    BILITOT 1.2 (H) 01/28/2018    ALKPHOS 123 01/28/2018     TSH:  No results found for: TSH    EKGINTERPRETATION - EKG Interpretation:  ATRIAL FIBRILLATION, QTC WITH IN RANGE      IMPRESSION / RECOMMENDATIONS:     1. Persistent atrial fibrillation sp cardioversion and on sotalol and now PAF  2. Stroke  3. CAD sp CABG  4. La appendage closure history  5. DM  6. MV repair history  7. Recent pleuredesis    Patient feels lot better now after a long time  On sotalol and coumadin  INR being monitored by  - needs therapeutic range. Continue sotalol and coumadin  Coreg was stopped because she is on sotalol. CXR - shows no effusion    She has PAF now  I want to give her 2 more months if she continues to have PAF episodes then may consider ablation of atrial fibrillation  She is feeling better too       Thanks again for allowing me to participate in care of this patient. Please call me if you have any questions. With best regards.       Tabatha Cook MD

## 2018-01-22 ENCOUNTER — TELEPHONE (OUTPATIENT)
Dept: CARDIOLOGY CLINIC | Age: 73
End: 2018-01-22

## 2018-01-22 NOTE — TELEPHONE ENCOUNTER
Left message for patient, asking if she can possibly come into office today at 4 pm so Susu can look at site. Message also routed to Susu as I am working at different office this afternoon.

## 2018-01-28 PROBLEM — R06.02 SHORTNESS OF BREATH: Status: ACTIVE | Noted: 2018-01-28

## 2018-02-03 PROBLEM — I50.9 ACUTE DECOMPENSATED HEART FAILURE (HCC): Status: ACTIVE | Noted: 2018-02-03

## 2018-02-15 ENCOUNTER — HOSPITAL ENCOUNTER (OUTPATIENT)
Dept: GENERAL RADIOLOGY | Age: 73
Discharge: HOME | End: 2018-02-15
Attending: INTERNAL MEDICINE | Admitting: INTERNAL MEDICINE

## 2018-02-15 LAB
ANION GAP SERPL CALCULATED.3IONS-SCNC: 12 MMOL/L (ref 4–16)
BUN BLDV-MCNC: 9 MG/DL (ref 6–23)
CALCIUM SERPL-MCNC: 9.9 MG/DL (ref 8.3–10.6)
CHLORIDE BLD-SCNC: 99 MMOL/L (ref 99–110)
CO2: 32 MMOL/L (ref 21–32)
CREAT SERPL-MCNC: 0.9 MG/DL (ref 0.6–1.1)
GFR AFRICAN AMERICAN: >60 ML/MIN/1.73M2
GFR NON-AFRICAN AMERICAN: >60 ML/MIN/1.73M2
GLUCOSE BLD-MCNC: 143 MG/DL (ref 70–99)
POTASSIUM SERPL-SCNC: 3.7 MMOL/L (ref 3.5–5.1)
SODIUM BLD-SCNC: 143 MMOL/L (ref 135–145)

## 2018-02-16 ENCOUNTER — OFFICE VISIT (OUTPATIENT)
Dept: CARDIOLOGY CLINIC | Age: 73
End: 2018-02-16

## 2018-02-16 DIAGNOSIS — I48.0 PAF (PAROXYSMAL ATRIAL FIBRILLATION) (HCC): Primary | ICD-10-CM

## 2018-02-16 PROCEDURE — 99999 PR OFFICE/OUTPT VISIT,PROCEDURE ONLY: CPT | Performed by: INTERNAL MEDICINE

## 2018-04-06 ENCOUNTER — OFFICE VISIT (OUTPATIENT)
Dept: CARDIOLOGY CLINIC | Age: 73
End: 2018-04-06

## 2018-04-06 VITALS
SYSTOLIC BLOOD PRESSURE: 162 MMHG | DIASTOLIC BLOOD PRESSURE: 70 MMHG | HEART RATE: 61 BPM | HEIGHT: 66 IN | WEIGHT: 176.8 LBS | BODY MASS INDEX: 28.42 KG/M2

## 2018-04-06 DIAGNOSIS — I48.0 PAROXYSMAL ATRIAL FIBRILLATION (HCC): ICD-10-CM

## 2018-04-06 PROCEDURE — 3017F COLORECTAL CA SCREEN DOC REV: CPT | Performed by: INTERNAL MEDICINE

## 2018-04-06 PROCEDURE — 4040F PNEUMOC VAC/ADMIN/RCVD: CPT | Performed by: INTERNAL MEDICINE

## 2018-04-06 PROCEDURE — 1090F PRES/ABSN URINE INCON ASSESS: CPT | Performed by: INTERNAL MEDICINE

## 2018-04-06 PROCEDURE — G8428 CUR MEDS NOT DOCUMENT: HCPCS | Performed by: INTERNAL MEDICINE

## 2018-04-06 PROCEDURE — 99213 OFFICE O/P EST LOW 20 MIN: CPT | Performed by: INTERNAL MEDICINE

## 2018-04-06 PROCEDURE — 1123F ACP DISCUSS/DSCN MKR DOCD: CPT | Performed by: INTERNAL MEDICINE

## 2018-04-06 PROCEDURE — 1036F TOBACCO NON-USER: CPT | Performed by: INTERNAL MEDICINE

## 2018-04-06 PROCEDURE — G8417 CALC BMI ABV UP PARAM F/U: HCPCS | Performed by: INTERNAL MEDICINE

## 2018-04-06 PROCEDURE — G8400 PT W/DXA NO RESULTS DOC: HCPCS | Performed by: INTERNAL MEDICINE

## 2018-04-06 PROCEDURE — 3014F SCREEN MAMMO DOC REV: CPT | Performed by: INTERNAL MEDICINE

## 2018-04-06 PROCEDURE — 93000 ELECTROCARDIOGRAM COMPLETE: CPT | Performed by: INTERNAL MEDICINE

## 2018-04-06 PROCEDURE — G8598 ASA/ANTIPLAT THER USED: HCPCS | Performed by: INTERNAL MEDICINE

## 2018-04-06 RX ORDER — DIAZEPAM 10 MG/1
10 TABLET ORAL EVERY 6 HOURS PRN
Status: ON HOLD | COMMUNITY
End: 2018-06-03 | Stop reason: CLARIF

## 2018-04-06 RX ORDER — ERYTHROMYCIN 250 MG/1
250 TABLET, COATED ORAL 4 TIMES DAILY
Status: ON HOLD | COMMUNITY
End: 2018-06-13

## 2018-06-03 PROBLEM — R55 SYNCOPE AND COLLAPSE: Status: ACTIVE | Noted: 2018-06-03

## 2018-06-13 ENCOUNTER — HOSPITAL ENCOUNTER (OUTPATIENT)
Dept: PREADMISSION TESTING | Age: 73
Discharge: HOME | End: 2018-07-12
Attending: THORACIC SURGERY (CARDIOTHORACIC VASCULAR SURGERY) | Admitting: THORACIC SURGERY (CARDIOTHORACIC VASCULAR SURGERY)

## 2018-06-13 PROBLEM — R00.1 SYMPTOMATIC BRADYCARDIA: Status: ACTIVE | Noted: 2018-06-13

## 2018-09-11 ENCOUNTER — HOSPITAL ENCOUNTER (OUTPATIENT)
Dept: GENERAL RADIOLOGY | Age: 73
Discharge: HOME | End: 2018-09-11
Attending: FAMILY MEDICINE | Admitting: FAMILY MEDICINE

## 2018-09-11 LAB
ALBUMIN SERPL-MCNC: 4.2 GM/DL (ref 3.4–5)
ALP BLD-CCNC: 121 IU/L (ref 40–128)
ALT SERPL-CCNC: 15 U/L (ref 10–40)
ANION GAP SERPL CALCULATED.3IONS-SCNC: 17 MMOL/L (ref 4–16)
AST SERPL-CCNC: 20 IU/L (ref 15–37)
BASOPHILS ABSOLUTE: 0.1 K/CU MM
BASOPHILS RELATIVE PERCENT: 1.1 % (ref 0–1)
BILIRUB SERPL-MCNC: 1.3 MG/DL (ref 0–1)
BUN BLDV-MCNC: 11 MG/DL (ref 6–23)
CALCIUM SERPL-MCNC: 10.3 MG/DL (ref 8.3–10.6)
CHLORIDE BLD-SCNC: 100 MMOL/L (ref 99–110)
CO2: 24 MMOL/L (ref 21–32)
CREAT SERPL-MCNC: 0.9 MG/DL (ref 0.6–1.1)
DIFFERENTIAL TYPE: ABNORMAL
EOSINOPHILS ABSOLUTE: 0.2 K/CU MM
EOSINOPHILS RELATIVE PERCENT: 2.3 % (ref 0–3)
ESTIMATED AVERAGE GLUCOSE: 137 MG/DL
GFR AFRICAN AMERICAN: >60 ML/MIN/1.73M2
GFR NON-AFRICAN AMERICAN: >60 ML/MIN/1.73M2
GLUCOSE FASTING: 151 MG/DL (ref 70–99)
HBA1C MFR BLD: 6.4 % (ref 4.2–6.3)
HCT VFR BLD CALC: 48.1 % (ref 37–47)
HEMOGLOBIN: 15.4 GM/DL (ref 12.5–16)
IMMATURE NEUTROPHIL %: 0.7 % (ref 0–0.43)
LYMPHOCYTES ABSOLUTE: 1.3 K/CU MM
LYMPHOCYTES RELATIVE PERCENT: 17.7 % (ref 24–44)
MCH RBC QN AUTO: 31 PG (ref 27–31)
MCHC RBC AUTO-ENTMCNC: 32 % (ref 32–36)
MCV RBC AUTO: 97 FL (ref 78–100)
MONOCYTES ABSOLUTE: 0.7 K/CU MM
MONOCYTES RELATIVE PERCENT: 9.1 % (ref 0–4)
NUCLEATED RBC %: 0 %
PDW BLD-RTO: 14.4 % (ref 11.7–14.9)
PLATELET # BLD: 201 K/CU MM (ref 140–440)
PMV BLD AUTO: 10.9 FL (ref 7.5–11.1)
POTASSIUM SERPL-SCNC: 4.7 MMOL/L (ref 3.5–5.1)
RBC # BLD: 4.96 M/CU MM (ref 4.2–5.4)
SEGMENTED NEUTROPHILS ABSOLUTE COUNT: 5.2 K/CU MM
SEGMENTED NEUTROPHILS RELATIVE PERCENT: 69.1 % (ref 36–66)
SODIUM BLD-SCNC: 141 MMOL/L (ref 135–145)
TOTAL IMMATURE NEUTOROPHIL: 0.05 K/CU MM
TOTAL NUCLEATED RBC: 0 K/CU MM
TOTAL PROTEIN: 6.4 GM/DL (ref 6.4–8.2)
WBC # BLD: 7.5 K/CU MM (ref 4–10.5)

## 2018-10-05 ENCOUNTER — OFFICE VISIT (OUTPATIENT)
Dept: CARDIOLOGY CLINIC | Age: 73
End: 2018-10-05
Payer: MEDICARE

## 2018-10-05 VITALS
HEART RATE: 60 BPM | SYSTOLIC BLOOD PRESSURE: 130 MMHG | WEIGHT: 184.8 LBS | DIASTOLIC BLOOD PRESSURE: 74 MMHG | HEIGHT: 66 IN | BODY MASS INDEX: 29.7 KG/M2

## 2018-10-05 DIAGNOSIS — Z95.0 CARDIAC PACEMAKER IN SITU: ICD-10-CM

## 2018-10-05 DIAGNOSIS — I48.0 PAROXYSMAL ATRIAL FIBRILLATION (HCC): Primary | ICD-10-CM

## 2018-10-05 PROCEDURE — 1123F ACP DISCUSS/DSCN MKR DOCD: CPT | Performed by: INTERNAL MEDICINE

## 2018-10-05 PROCEDURE — G8484 FLU IMMUNIZE NO ADMIN: HCPCS | Performed by: INTERNAL MEDICINE

## 2018-10-05 PROCEDURE — G8417 CALC BMI ABV UP PARAM F/U: HCPCS | Performed by: INTERNAL MEDICINE

## 2018-10-05 PROCEDURE — 99212 OFFICE O/P EST SF 10 MIN: CPT | Performed by: INTERNAL MEDICINE

## 2018-10-05 PROCEDURE — G8427 DOCREV CUR MEDS BY ELIG CLIN: HCPCS | Performed by: INTERNAL MEDICINE

## 2018-10-05 PROCEDURE — 1090F PRES/ABSN URINE INCON ASSESS: CPT | Performed by: INTERNAL MEDICINE

## 2018-10-05 PROCEDURE — 4040F PNEUMOC VAC/ADMIN/RCVD: CPT | Performed by: INTERNAL MEDICINE

## 2018-10-05 PROCEDURE — 1101F PT FALLS ASSESS-DOCD LE1/YR: CPT | Performed by: INTERNAL MEDICINE

## 2018-10-05 PROCEDURE — G8598 ASA/ANTIPLAT THER USED: HCPCS | Performed by: INTERNAL MEDICINE

## 2018-10-05 PROCEDURE — G8400 PT W/DXA NO RESULTS DOC: HCPCS | Performed by: INTERNAL MEDICINE

## 2018-10-05 PROCEDURE — 1036F TOBACCO NON-USER: CPT | Performed by: INTERNAL MEDICINE

## 2018-10-05 PROCEDURE — 3017F COLORECTAL CA SCREEN DOC REV: CPT | Performed by: INTERNAL MEDICINE

## 2018-10-05 PROCEDURE — 93280 PM DEVICE PROGR EVAL DUAL: CPT | Performed by: INTERNAL MEDICINE

## 2018-11-12 ENCOUNTER — HOSPITAL ENCOUNTER (OUTPATIENT)
Dept: CT IMAGING | Age: 73
Discharge: HOME | End: 2018-11-12
Payer: MEDICARE

## 2018-11-12 DIAGNOSIS — I71.20 THORACIC AORTIC ANEURYSM WITHOUT RUPTURE: ICD-10-CM

## 2018-11-12 LAB
GFR AFRICAN AMERICAN: >60 ML/MIN/1.73M2
GFR NON-AFRICAN AMERICAN: 56 ML/MIN/1.73M2
POC CREATININE: 1 MG/DL (ref 0.6–1.1)

## 2018-11-12 PROCEDURE — 6360000004 HC RX CONTRAST MEDICATION: Performed by: THORACIC SURGERY (CARDIOTHORACIC VASCULAR SURGERY)

## 2018-11-12 PROCEDURE — 74175 CTA ABDOMEN W/CONTRAST: CPT

## 2018-11-12 PROCEDURE — 71275 CT ANGIOGRAPHY CHEST: CPT

## 2018-11-12 RX ORDER — 0.9 % SODIUM CHLORIDE 0.9 %
10 VIAL (ML) INJECTION
Status: DISCONTINUED | OUTPATIENT
Start: 2018-11-12 | End: 2018-11-13 | Stop reason: HOSPADM

## 2018-11-12 RX ADMIN — IOPAMIDOL 75 ML: 755 INJECTION, SOLUTION INTRAVENOUS at 12:20

## 2019-04-12 ENCOUNTER — HOSPITAL ENCOUNTER (EMERGENCY)
Age: 74
Discharge: HOME | End: 2019-04-12
Attending: EMERGENCY MEDICINE
Payer: MEDICARE

## 2019-04-12 ENCOUNTER — APPOINTMENT (OUTPATIENT)
Dept: GENERAL RADIOLOGY | Age: 74
End: 2019-04-12
Payer: MEDICARE

## 2019-04-12 VITALS
HEART RATE: 59 BPM | HEIGHT: 66 IN | RESPIRATION RATE: 16 BRPM | TEMPERATURE: 99.6 F | BODY MASS INDEX: 30.22 KG/M2 | OXYGEN SATURATION: 96 % | SYSTOLIC BLOOD PRESSURE: 125 MMHG | WEIGHT: 188 LBS | DIASTOLIC BLOOD PRESSURE: 50 MMHG

## 2019-04-12 DIAGNOSIS — R05.9 COUGH: Primary | ICD-10-CM

## 2019-04-12 LAB
ALBUMIN SERPL-MCNC: 3.9 GM/DL (ref 3.4–5)
ALP BLD-CCNC: 119 IU/L (ref 40–129)
ALT SERPL-CCNC: 11 U/L (ref 10–40)
ANION GAP SERPL CALCULATED.3IONS-SCNC: 16 MMOL/L (ref 4–16)
AST SERPL-CCNC: 17 IU/L (ref 15–37)
BASOPHILS ABSOLUTE: 0.1 K/CU MM
BASOPHILS RELATIVE PERCENT: 1 % (ref 0–1)
BILIRUB SERPL-MCNC: 1.4 MG/DL (ref 0–1)
BUN BLDV-MCNC: 8 MG/DL (ref 6–23)
CALCIUM SERPL-MCNC: 9.5 MG/DL (ref 8.3–10.6)
CHLORIDE BLD-SCNC: 97 MMOL/L (ref 99–110)
CO2: 26 MMOL/L (ref 21–32)
CREAT SERPL-MCNC: 0.9 MG/DL (ref 0.6–1.1)
DIFFERENTIAL TYPE: ABNORMAL
EOSINOPHILS ABSOLUTE: 0.2 K/CU MM
EOSINOPHILS RELATIVE PERCENT: 1.8 % (ref 0–3)
GFR AFRICAN AMERICAN: >60 ML/MIN/1.73M2
GFR NON-AFRICAN AMERICAN: >60 ML/MIN/1.73M2
GLUCOSE BLD-MCNC: 203 MG/DL (ref 70–99)
HCT VFR BLD CALC: 48 % (ref 37–47)
HEMOGLOBIN: 15.9 GM/DL (ref 12.5–16)
IMMATURE NEUTROPHIL %: 1.5 % (ref 0–0.43)
INR BLD: 2.37 INDEX
LYMPHOCYTES ABSOLUTE: 1.3 K/CU MM
LYMPHOCYTES RELATIVE PERCENT: 15.2 % (ref 24–44)
MCH RBC QN AUTO: 31.5 PG (ref 27–31)
MCHC RBC AUTO-ENTMCNC: 33.1 % (ref 32–36)
MCV RBC AUTO: 95 FL (ref 78–100)
MONOCYTES ABSOLUTE: 0.7 K/CU MM
MONOCYTES RELATIVE PERCENT: 8.3 % (ref 0–4)
NUCLEATED RBC %: 0 %
PDW BLD-RTO: 13.9 % (ref 11.7–14.9)
PLATELET # BLD: 220 K/CU MM (ref 140–440)
PMV BLD AUTO: 10 FL (ref 7.5–11.1)
POTASSIUM SERPL-SCNC: 4.1 MMOL/L (ref 3.5–5.1)
PROTHROMBIN TIME: 27.3 SECONDS (ref 9.12–12.5)
RBC # BLD: 5.05 M/CU MM (ref 4.2–5.4)
SEGMENTED NEUTROPHILS ABSOLUTE COUNT: 6.4 K/CU MM
SEGMENTED NEUTROPHILS RELATIVE PERCENT: 72.2 % (ref 36–66)
SODIUM BLD-SCNC: 139 MMOL/L (ref 135–145)
TOTAL IMMATURE NEUTOROPHIL: 0.13 K/CU MM
TOTAL NUCLEATED RBC: 0 K/CU MM
TOTAL PROTEIN: 6.6 GM/DL (ref 6.4–8.2)
TROPONIN T: <0.01 NG/ML
WBC # BLD: 8.8 K/CU MM (ref 4–10.5)

## 2019-04-12 PROCEDURE — 93005 ELECTROCARDIOGRAM TRACING: CPT | Performed by: EMERGENCY MEDICINE

## 2019-04-12 PROCEDURE — 6370000000 HC RX 637 (ALT 250 FOR IP): Performed by: EMERGENCY MEDICINE

## 2019-04-12 PROCEDURE — 99284 EMERGENCY DEPT VISIT MOD MDM: CPT

## 2019-04-12 PROCEDURE — 85025 COMPLETE CBC W/AUTO DIFF WBC: CPT

## 2019-04-12 PROCEDURE — 36415 COLL VENOUS BLD VENIPUNCTURE: CPT

## 2019-04-12 PROCEDURE — 80053 COMPREHEN METABOLIC PANEL: CPT

## 2019-04-12 PROCEDURE — 85610 PROTHROMBIN TIME: CPT

## 2019-04-12 PROCEDURE — 93010 ELECTROCARDIOGRAM REPORT: CPT | Performed by: INTERNAL MEDICINE

## 2019-04-12 PROCEDURE — 84484 ASSAY OF TROPONIN QUANT: CPT

## 2019-04-12 PROCEDURE — 71045 X-RAY EXAM CHEST 1 VIEW: CPT

## 2019-04-12 RX ORDER — FUROSEMIDE 40 MG/1
40 TABLET ORAL ONCE
Status: COMPLETED | OUTPATIENT
Start: 2019-04-12 | End: 2019-04-12

## 2019-04-12 RX ADMIN — FUROSEMIDE 40 MG: 40 TABLET ORAL at 11:32

## 2019-04-12 ASSESSMENT — PAIN SCALES - GENERAL: PAINLEVEL_OUTOF10: 7

## 2019-04-12 ASSESSMENT — PAIN DESCRIPTION - LOCATION: LOCATION: ABDOMEN;RIB CAGE

## 2019-04-12 NOTE — ED PROVIDER NOTES
[email or web address] CATHETERIZATION  2017    Dr. Alexis Johnston. Diagnostic only, no intervention done.  CARDIOVERSION      CHOLECYSTECTOMY          COLONOSCOPY  2010    CORONARY ARTERY BYPASS GRAFT  2017    [place] to LAD. Done per Dr. Kimberley Gomez with mitral valve repair     MITRAL VALVE SURGERY  2017    MV repair with ring     OTHER SURGICAL HISTORY      per old chart pt had exc. fistula anal canal with Dr Vida Loyola done 2010    PACEMAKER PLACEMENT  2018    THORACOSCOPY Left 2017    with decortication     TUBAL LIGATION  age 43's     Family History   Problem Relation Age of Onset    Alcohol Abuse Father     Breast Cancer Sister     Alcohol Abuse Sister     Alcohol Abuse Brother     Cancer Brother         prostate    Alcohol Abuse Brother     Alcohol Abuse Brother     Alcohol Abuse Brother     Alcohol Abuse Brother      Social History     Socioeconomic History    Marital status:       Spouse name: Not on file    Number of children: Not on file    Years of education: Not on file    Highest education level: Not on file   Occupational History    Not on file   Social Needs    Financial resource strain: Not on file    Food insecurity:     Worry: Not on file     Inability: Not on file    Transportation needs:     Medical: Not on file     Non-medical: Not on file   Tobacco Use    Smoking status: Former Smoker     Packs/day: 0.50     Years: 17.00     Pack years: 8.50     Types: Cigarettes     Last attempt to quit: 2001     Years since quittin.1    Smokeless tobacco: Never Used   Substance and Sexual Activity    Alcohol use: No    Drug use: No    Sexual activity: Not on file   Lifestyle    Physical activity:     Days per week: Not on file     Minutes per session: Not on file    Stress: Not on file   Relationships    Social connections:     Talks on phone: Not on file     Gets together: Not on file     Attends [employer] service: Not on file     Active member of club or organization: Not on file     Attends meetings of clubs or organizations: Not on file     Relationship status: Not on file    Intimate partner violence:     Fear of current or ex partner: Not on file     Emotionally abused: Not on file     Physically abused: Not on file     Forced sexual activity: Not on file   Other Topics Concern    Not on file   Social History Narrative    Not on file     No current facility-administered medications for this encounter.       Current Outpatient Medications   Medication Sig Dispense Refill    atorvastatin (LIPITOR) 20 MG tablet Take 1 tablet by mouth nightly 30 tablet 3    metoprolol tartrate (LOPRESSOR) 25 MG tablet Take 1 tablet by mouth 2 times daily 60 tablet 3    FLUoxetine (PROZAC) 20 MG capsule Take 1 capsule by mouth daily 30 capsule 0    furosemide (LASIX) 40 MG tablet Take 1 tablet by mouth 2 times daily 60 tablet 0    spironolactone (ALDACTONE) 25 MG tablet Take 1 tablet by mouth 2 times daily 60 tablet 0    warfarin (COUMADIN) 2.5 MG tablet Take one tablet in the evening every day  Please check INR tomorrow at 1/16/2018 with  office and dose of coumadin may need to be adjusted (Patient taking differently: Sunday, Tuesday, Thursday and Saturday) 30 tablet 0    sotalol (BETAPACE) 80 MG tablet Take 0.5 tablets by mouth 2 times daily 60 tablet 3    levETIRAcetam (KEPPRA) 750 MG tablet TK  1 T PO BID  2    metFORMIN (GLUCOPHAGE) 1000 MG tablet TK 1 T PO  BID  4    glimepiride (AMARYL) 2 MG tablet Take 1 mg by mouth every morning (before breakfast)       aspirin 81 MG EC tablet Take 1 tablet by mouth daily 30 tablet 3    ipratropium-albuterol (DUONEB) 0.5-2.5 (3) MG/3ML SOLN nebulizer solution Take 1 vial by nebulization every 4 hours as needed       budesonide-formoterol (SYMBICORT) 160-4.5 MCG/ACT AERO Inhale 2 puffs into the lungs 2 times daily 1 Inhaler 5    linagliptin (TRADJENTA) 5 MG tablet Take 5 mg by mouth daily      LORazepam (ATIVAN) 1 MG tablet Take 1 mg by mouth every 6 hours as needed for Anxiety Up to 4 a day.  levothyroxine (SYNTHROID) 137 MCG tablet Take 125 mcg by mouth Daily        Allergies   Allergen Reactions    Ceclor [Cefaclor] Hives    Tape [Adhesive Tape] Rash     Paper tape ok       Nursing Notes Reviewed    Physical Exam:  ED Triage Vitals [04/12/19 0913]   Enc Vitals Group      /63      Pulse 86      Resp 16      Temp 99.6 °F (37.6 °C)      Temp src       SpO2 96 %      Weight 188 lb (85.3 kg)      Height 5' 6\" (1.676 m)      Head Circumference       Peak Flow       Pain Score       Pain Loc       Pain Edu? Excl. in [address]? GENERAL APPEARANCE: Awake and alert. Cooperative. No acute distress. Sitting up in bed. HEAD: Normocephalic. Atraumatic. EYES: EOM's grossly intact. Sclera anicteric. ENT: Mucous membranes are moist. Tolerates saliva. No trismus. NECK: Supple. Trachea midline. HEART: RRR. Radial pulses 2+. LUNGS: Respirations unlabored. CTAB  ABDOMEN: Soft. Non-tender. No guarding or rebound. Non distended  EXTREMITIES: No acute deformities. No edema  SKIN: Warm and dry. NEUROLOGICAL: No gross facial drooping. Moves all 4 extremities spontaneously. PSYCHIATRIC: Normal mood.     I have reviewed and interpreted all of the currently available lab results from this visit (if applicable):  Results for orders placed or performed during the hospital encounter of 04/12/19   CBC Auto Differential   Result Value Ref Range    WBC 8.8 4.0 - 10.5 K/CU MM    RBC 5.05 4.2 - 5.4 M/CU MM    Hemoglobin 15.9 12.5 - 16.0 GM/DL    Hematocrit 48.0 (H) 37 - 47 %    MCV 95.0 78 - 100 FL    MCH 31.5 (H) 27 - 31 PG    MCHC 33.1 32.0 - 36.0 %    RDW 13.9 11.7 - 14.9 %    Platelets 436 104 - 196 K/CU MM    MPV 10.0 7.5 - 11.1 FL    Differential Type AUTOMATED DIFFERENTIAL     Segs Relative 72.2 (H) 36 - 66 %    Lymphocytes % 15.2 (L) 24 - 44 %    Monocytes % 8.3 (H) 0 - 4 %    Eosinophils % 1.8 0 - 3 %    Basophils % 1.0 0 - 1 %    Segs Absolute 6.4 K/CU MM    Lymphocytes # 1.3 K/CU MM    Monocytes # 0.7 K/CU MM    Eosinophils # 0.2 K/CU MM    Basophils # 0.1 K/CU MM    Nucleated RBC % 0.0 %    Total Nucleated RBC 0.0 K/CU MM    Total Immature Neutrophil 0.13 K/CU MM    Immature Neutrophil % 1.5 (H) 0 - 0.43 %   CMP   Result Value Ref Range    Sodium 139 135 - 145 MMOL/L    Potassium 4.1 3.5 - 5.1 MMOL/L    Chloride 97 (L) 99 - 110 mMol/L    CO2 26 21 - 32 MMOL/L    BUN 8 6 - 23 MG/DL    CREATININE 0.9 0.6 - 1.1 MG/DL    Glucose 203 (H) 70 - 99 MG/DL    Calcium 9.5 8.3 - 10.6 MG/DL    Alb 3.9 3.4 - 5.0 GM/DL    Total Protein 6.6 6.4 - 8.2 GM/DL    Total Bilirubin 1.4 (H) 0.0 - 1.0 MG/DL    ALT 11 10 - 40 U/L    AST 17 15 - 37 IU/L    Alkaline Phosphatase 119 40 - 129 IU/L    GFR Non-African American >60 >60 mL/min/1.73m2    GFR African American >60 >60 mL/min/1.73m2    Anion Gap 16 4 - 16   Troponin   Result Value Ref Range    Troponin T <0.010 <0.01 NG/ML   PT - INR   Result Value Ref Range    Protime 27.3 (H) 9.12 - 12.5 SECONDS    INR 2.37 INDEX   EKG 12 Lead   Result Value Ref Range    Ventricular Rate 61 BPM    Atrial Rate 60 BPM    P-R Interval 226 ms    QRS Duration 80 ms    Q-T Interval 428 ms    QTc Calculation (Bazett) 430 ms    R Axis 35 degrees    T Axis 85 degrees    Diagnosis       Atrial-paced rhythm with prolonged AV conduction  T wave abnormality, consider lateral ischemia  Abnormal ECG  When compared with ECG of 02-JUN-2018 18:13,  Electronic atrial pacemaker has replaced Sinus rhythm    Confirmed by [facility] Ange FRANKLIN ([id number]) on 4/12/2019 10:39:00 AM        Radiographs (if obtained):  [] The following radiograph was interpreted by myself in the absence of a radiologist:  [x] Radiologist's Report Reviewed:    EKG (if obtained): (All EKG's are interpreted by myself in the absence of a cardiologist)  Atrial paced rhythm with prolonged AV conduction.   Patient has new inverted T waves in the

## 2019-04-18 LAB
EKG ATRIAL RATE: 60 BPM
EKG DIAGNOSIS: NORMAL
EKG P-R INTERVAL: 226 MS
EKG Q-T INTERVAL: 428 MS
EKG QRS DURATION: 80 MS
EKG QTC CALCULATION (BAZETT): 430 MS
EKG R AXIS: 35 DEGREES
EKG T AXIS: 85 DEGREES
EKG VENTRICULAR RATE: 61 BPM

## 2019-05-07 ENCOUNTER — HOSPITAL ENCOUNTER (OUTPATIENT)
Dept: WOMENS IMAGING | Age: 74
Discharge: HOME | End: 2019-05-07
Payer: MEDICARE

## 2019-05-07 DIAGNOSIS — Z12.31 VISIT FOR SCREENING MAMMOGRAM: ICD-10-CM

## 2019-05-07 PROCEDURE — 77067 SCR MAMMO BI INCL CAD: CPT

## 2019-09-09 ENCOUNTER — HOSPITAL ENCOUNTER (OUTPATIENT)
Age: 74
Discharge: HOME | End: 2019-09-09
Payer: MEDICARE

## 2019-09-09 LAB
ALBUMIN SERPL-MCNC: 4.6 GM/DL (ref 3.4–5)
ALP BLD-CCNC: 107 IU/L (ref 40–129)
ALT SERPL-CCNC: 17 U/L (ref 10–40)
AST SERPL-CCNC: 24 IU/L (ref 15–37)
BILIRUB SERPL-MCNC: 1.4 MG/DL (ref 0–1)
BILIRUBIN DIRECT: 0.4 MG/DL (ref 0–0.3)
BILIRUBIN, INDIRECT: 1 MG/DL (ref 0–0.7)
FERRITIN: 111 NG/ML (ref 15–150)
HAV IGM SER IA-ACNC: NON REACTIVE
HEPATITIS B CORE IGM ANTIBODY: NON REACTIVE
HEPATITIS B SURFACE ANTIGEN: NON REACTIVE
HEPATITIS C ANTIBODY: NON REACTIVE
IRON: 125 UG/DL (ref 37–145)
PCT TRANSFERRIN: 36 % (ref 10–44)
TOTAL IRON BINDING CAPACITY: 344 UG/DL (ref 250–450)
TOTAL PROTEIN: 6.8 GM/DL (ref 6.4–8.2)
UNSATURATED IRON BINDING CAPACITY: 219 UG/DL (ref 110–370)

## 2019-09-09 PROCEDURE — 83516 IMMUNOASSAY NONANTIBODY: CPT

## 2019-09-09 PROCEDURE — 83540 ASSAY OF IRON: CPT

## 2019-09-09 PROCEDURE — 86256 FLUORESCENT ANTIBODY TITER: CPT

## 2019-09-09 PROCEDURE — 82728 ASSAY OF FERRITIN: CPT

## 2019-09-09 PROCEDURE — 80076 HEPATIC FUNCTION PANEL: CPT

## 2019-09-09 PROCEDURE — 83550 IRON BINDING TEST: CPT

## 2019-09-09 PROCEDURE — 36415 COLL VENOUS BLD VENIPUNCTURE: CPT

## 2019-09-09 PROCEDURE — 86038 ANTINUCLEAR ANTIBODIES: CPT

## 2019-09-09 PROCEDURE — 80074 ACUTE HEPATITIS PANEL: CPT

## 2019-09-11 LAB
ANTI-MITOCHON TITER: 3.5 UNITS (ref 0–20)
ANTI-MITOCHON TITER: NORMAL UNITS (ref 0–20)
ANTI-NUCLEAR ANTIBODY (ANA): NORMAL
ANTI-NUCLEAR ANTIBODY (ANA): NORMAL
F-ACTIN AB, IGG: 4 UNITS (ref 0–19)
F-ACTIN AB, IGG: NORMAL UNITS (ref 0–19)

## 2019-10-11 RX ORDER — SODIUM CHLORIDE 0.9 % (FLUSH) 0.9 %
10 SYRINGE (ML) INJECTION PRN
Status: CANCELLED | OUTPATIENT
Start: 2019-10-11

## 2019-10-14 ENCOUNTER — HOSPITAL ENCOUNTER (OUTPATIENT)
Dept: INTERVENTIONAL RADIOLOGY/VASCULAR | Age: 74
Discharge: HOME | End: 2019-10-14

## 2019-11-11 ENCOUNTER — HOSPITAL ENCOUNTER (OUTPATIENT)
Dept: WOMENS IMAGING | Age: 74
Discharge: HOME | End: 2019-11-11
Payer: MEDICARE

## 2019-11-11 ENCOUNTER — HOSPITAL ENCOUNTER (OUTPATIENT)
Dept: CT IMAGING | Age: 74
Discharge: HOME | End: 2019-11-11
Payer: MEDICARE

## 2019-11-11 DIAGNOSIS — Z78.0 ASYMPTOMATIC AGE-RELATED POSTMENOPAUSAL STATE: ICD-10-CM

## 2019-11-11 DIAGNOSIS — I25.41 ANEURYSM (ARTERIOVENOUS) OF CORONARY VESSELS: ICD-10-CM

## 2019-11-11 LAB
GFR AFRICAN AMERICAN: >60 ML/MIN/1.73M2
GFR NON-AFRICAN AMERICAN: >60 ML/MIN/1.73M2
POC CREATININE: 0.8 MG/DL (ref 0.6–1.1)

## 2019-11-11 PROCEDURE — 71275 CT ANGIOGRAPHY CHEST: CPT

## 2019-11-11 PROCEDURE — 77080 DXA BONE DENSITY AXIAL: CPT

## 2019-11-11 PROCEDURE — 6360000004 HC RX CONTRAST MEDICATION: Performed by: THORACIC SURGERY (CARDIOTHORACIC VASCULAR SURGERY)

## 2019-11-11 PROCEDURE — 2580000003 HC RX 258: Performed by: THORACIC SURGERY (CARDIOTHORACIC VASCULAR SURGERY)

## 2019-11-11 RX ORDER — SODIUM CHLORIDE 0.9 % (FLUSH) 0.9 %
10 SYRINGE (ML) INJECTION ONCE
Status: COMPLETED | OUTPATIENT
Start: 2019-11-11 | End: 2019-11-11

## 2019-11-11 RX ADMIN — IOPAMIDOL 75 ML: 755 INJECTION, SOLUTION INTRAVENOUS at 10:30

## 2019-11-11 RX ADMIN — Medication 10 ML: at 10:31

## 2019-11-15 ENCOUNTER — HOSPITAL ENCOUNTER (EMERGENCY)
Age: 74
Discharge: HOME | End: 2019-11-15
Attending: EMERGENCY MEDICINE
Payer: MEDICARE

## 2019-11-15 ENCOUNTER — APPOINTMENT (OUTPATIENT)
Dept: GENERAL RADIOLOGY | Age: 74
End: 2019-11-15
Payer: MEDICARE

## 2019-11-15 VITALS
SYSTOLIC BLOOD PRESSURE: 154 MMHG | RESPIRATION RATE: 16 BRPM | OXYGEN SATURATION: 98 % | DIASTOLIC BLOOD PRESSURE: 78 MMHG | BODY MASS INDEX: 30.49 KG/M2 | TEMPERATURE: 97.8 F | HEART RATE: 68 BPM | WEIGHT: 183 LBS | HEIGHT: 65 IN

## 2019-11-15 DIAGNOSIS — M25.561 ACUTE PAIN OF RIGHT KNEE: Primary | ICD-10-CM

## 2019-11-15 DIAGNOSIS — L03.115 CELLULITIS OF RIGHT LOWER EXTREMITY: ICD-10-CM

## 2019-11-15 PROCEDURE — 6370000000 HC RX 637 (ALT 250 FOR IP): Performed by: EMERGENCY MEDICINE

## 2019-11-15 PROCEDURE — 99283 EMERGENCY DEPT VISIT LOW MDM: CPT

## 2019-11-15 PROCEDURE — 73560 X-RAY EXAM OF KNEE 1 OR 2: CPT

## 2019-11-15 RX ORDER — HYDROCODONE BITARTRATE AND ACETAMINOPHEN 5; 325 MG/1; MG/1
1 TABLET ORAL ONCE
Status: COMPLETED | OUTPATIENT
Start: 2019-11-15 | End: 2019-11-15

## 2019-11-15 RX ORDER — CEPHALEXIN 500 MG/1
500 CAPSULE ORAL 4 TIMES DAILY
Qty: 28 CAPSULE | Refills: 0 | Status: SHIPPED | OUTPATIENT
Start: 2019-11-15 | End: 2019-11-22

## 2019-11-15 RX ADMIN — HYDROCODONE BITARTRATE AND ACETAMINOPHEN 1 TABLET: 5; 325 TABLET ORAL at 12:19

## 2019-11-15 ASSESSMENT — PAIN DESCRIPTION - ONSET: ONSET: SUDDEN

## 2019-11-15 ASSESSMENT — PAIN SCALES - GENERAL: PAINLEVEL_OUTOF10: 6

## 2019-11-15 ASSESSMENT — PAIN DESCRIPTION - LOCATION: LOCATION: KNEE

## 2019-11-15 ASSESSMENT — PAIN DESCRIPTION - ORIENTATION: ORIENTATION: RIGHT

## 2019-11-15 ASSESSMENT — PAIN DESCRIPTION - FREQUENCY: FREQUENCY: CONTINUOUS

## 2019-11-15 ASSESSMENT — PAIN DESCRIPTION - PAIN TYPE: TYPE: ACUTE PAIN

## 2019-11-15 ASSESSMENT — PAIN DESCRIPTION - DESCRIPTORS: DESCRIPTORS: CONSTANT;ACHING

## 2019-11-15 ASSESSMENT — PAIN DESCRIPTION - PROGRESSION: CLINICAL_PROGRESSION: GRADUALLY WORSENING

## 2020-01-04 PROBLEM — I71.21 ASCENDING AORTIC ANEURYSM: Status: ACTIVE | Noted: 2020-01-04

## 2020-06-22 ENCOUNTER — HOSPITAL ENCOUNTER (OUTPATIENT)
Dept: MAMMOGRAPHY | Age: 75
Discharge: HOME | End: 2020-06-22
Payer: MEDICARE

## 2020-06-22 PROCEDURE — 77067 SCR MAMMO BI INCL CAD: CPT

## 2020-09-08 PROBLEM — I63.9 CEREBROVASCULAR ACCIDENT (CVA) (HCC): Status: ACTIVE | Noted: 2020-09-08

## 2020-11-03 PROBLEM — R55 SYNCOPE: Status: RESOLVED | Noted: 2020-11-03 | Resolved: 2020-11-03

## 2021-02-04 ENCOUNTER — APPOINTMENT (OUTPATIENT)
Dept: GENERAL RADIOLOGY | Age: 76
End: 2021-02-04
Payer: MEDICARE

## 2021-02-04 ENCOUNTER — APPOINTMENT (OUTPATIENT)
Dept: CT IMAGING | Age: 76
End: 2021-02-04
Payer: MEDICARE

## 2021-02-04 ENCOUNTER — HOSPITAL ENCOUNTER (EMERGENCY)
Age: 76
Discharge: HOME | End: 2021-02-04
Attending: EMERGENCY MEDICINE
Payer: MEDICARE

## 2021-02-04 VITALS
SYSTOLIC BLOOD PRESSURE: 154 MMHG | RESPIRATION RATE: 20 BRPM | TEMPERATURE: 98.4 F | HEART RATE: 64 BPM | WEIGHT: 180 LBS | BODY MASS INDEX: 28.93 KG/M2 | DIASTOLIC BLOOD PRESSURE: 56 MMHG | HEIGHT: 66 IN | OXYGEN SATURATION: 97 %

## 2021-02-04 DIAGNOSIS — R79.1 SUBTHERAPEUTIC INTERNATIONAL NORMALIZED RATIO (INR): ICD-10-CM

## 2021-02-04 DIAGNOSIS — I71.23 ANEURYSM OF DESCENDING THORACIC AORTA: ICD-10-CM

## 2021-02-04 DIAGNOSIS — S00.03XA LEFT PARIETAL SCALP HEMATOMA, INITIAL ENCOUNTER: ICD-10-CM

## 2021-02-04 DIAGNOSIS — D68.32 WARFARIN-INDUCED COAGULOPATHY (HCC): ICD-10-CM

## 2021-02-04 DIAGNOSIS — T45.515A WARFARIN-INDUCED COAGULOPATHY (HCC): ICD-10-CM

## 2021-02-04 DIAGNOSIS — S01.01XA LACERATION OF SCALP, INITIAL ENCOUNTER: ICD-10-CM
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
## 2021-02-04 DIAGNOSIS — S09.8XXA BLUNT HEAD TRAUMA, INITIAL ENCOUNTER: ICD-10-CM

## 2021-02-04 LAB
ANION GAP SERPL CALCULATED.3IONS-SCNC: 6 MMOL/L (ref 4–16)
BASOPHILS ABSOLUTE: 0.1 K/CU MM
BASOPHILS RELATIVE PERCENT: 0.9 % (ref 0–1)
BUN BLDV-MCNC: 11 MG/DL (ref 6–23)
CALCIUM SERPL-MCNC: 9.7 MG/DL (ref 8.3–10.6)
CHLORIDE BLD-SCNC: 99 MMOL/L (ref 99–110)
CO2: 33 MMOL/L (ref 21–32)
CREAT SERPL-MCNC: 1 MG/DL (ref 0.6–1.1)
DIFFERENTIAL TYPE: ABNORMAL
EOSINOPHILS ABSOLUTE: 0.2 K/CU MM
EOSINOPHILS RELATIVE PERCENT: 2.1 % (ref 0–3)
GFR AFRICAN AMERICAN: >60 ML/MIN/1.73M2
GFR NON-AFRICAN AMERICAN: 54 ML/MIN/1.73M2
GLUCOSE BLD-MCNC: 243 MG/DL (ref 70–99)
HCT VFR BLD CALC: 48.8 % (ref 37–47)
HEMOGLOBIN: 16.1 GM/DL (ref 12.5–16)
IMMATURE NEUTROPHIL %: 1.5 % (ref 0–0.43)
INR BLD: 1.51 INDEX
LYMPHOCYTES ABSOLUTE: 1 K/CU MM
LYMPHOCYTES RELATIVE PERCENT: 11.5 % (ref 24–44)
MCH RBC QN AUTO: 32.2 PG (ref 27–31)
MCHC RBC AUTO-ENTMCNC: 33 % (ref 32–36)
MCV RBC AUTO: 97.6 FL (ref 78–100)
MONOCYTES ABSOLUTE: 0.5 K/CU MM
MONOCYTES RELATIVE PERCENT: 5.6 % (ref 0–4)
PDW BLD-RTO: 13.1 % (ref 11.7–14.9)
PLATELET # BLD: 172 K/CU MM (ref 140–440)
PMV BLD AUTO: 10 FL (ref 7.5–11.1)
POTASSIUM SERPL-SCNC: 4.3 MMOL/L (ref 3.5–5.1)
PROTHROMBIN TIME: 17.4 SECONDS (ref 11.7–14.5)
RBC # BLD: 5 M/CU MM (ref 4.2–5.4)
SEGMENTED NEUTROPHILS ABSOLUTE COUNT: 6.8 K/CU MM
SEGMENTED NEUTROPHILS RELATIVE PERCENT: 78.4 % (ref 36–66)
SODIUM BLD-SCNC: 138 MMOL/L (ref 135–145)
TOTAL IMMATURE NEUTOROPHIL: 0.13 K/CU MM
WBC # BLD: 8.7 K/CU MM (ref 4–10.5)

## 2021-02-04 PROCEDURE — 85025 COMPLETE CBC W/AUTO DIFF WBC: CPT

## 2021-02-04 PROCEDURE — 99285 EMERGENCY DEPT VISIT HI MDM: CPT

## 2021-02-04 PROCEDURE — 85610 PROTHROMBIN TIME: CPT

## 2021-02-04 PROCEDURE — 70450 CT HEAD/BRAIN W/O DYE: CPT

## 2021-02-04 PROCEDURE — 80048 BASIC METABOLIC PNL TOTAL CA: CPT

## 2021-02-04 PROCEDURE — 72125 CT NECK SPINE W/O DYE: CPT

## 2021-02-04 PROCEDURE — 71046 X-RAY EXAM CHEST 2 VIEWS: CPT

## 2021-02-04 ASSESSMENT — PAIN DESCRIPTION - DESCRIPTORS: DESCRIPTORS: ACHING

## 2021-02-04 ASSESSMENT — PAIN SCALES - GENERAL: PAINLEVEL_OUTOF10: 5

## 2021-02-04 NOTE — ED PROVIDER NOTES
Emergency Department Encounter    Patient: Iva Yip  MRN: [id number]  : 1945  Date of Evaluation: 2021  ED Provider:  Angelina Kingsley    Triage Chief Complaint:   Fall (states fell on ice and hit the back of her head. pt has laceration to back of head. pt is on Coumadin. denies LOC. )      [place]:  Iva Yip is a 77 y.o. female that presents to the emergency department for evaluation after a fall. Patient notes that she had returned from doing some errands. She was outside her apartment complex. Patient did not notice that there was that she device under her. She tried to balance herself and fell back, hit the back of her head. Denies loss of consciousness. She is able to recall all events leading up to immediately after the incident. She had a difficult time getting back up due to the ice and someone saw her. She asked for help getting up. Bystanders noticed that there was bleeding and called EMS. Patient does have a laceration to the back of her head. Patient does take Coumadin due to \"heart surgery\" last INR was checked last week and was 2.3 according to patient. Goal INR is between 2.3-2.8. Patient denies pain or injury elsewhere. Denies loss of bowel bladder function or urinary retention. Denies saddle anesthesia. Denies any pain in her joints or extremities. Denies syncope, dizziness, lightheadedness. No double vision, blurry vision, change in vision. No tinnitus, buzzing, ringing or ears. No additional precipitating, modifying, alleviating features. ROS - see HPI, below listed is current ROS at time of my eval:  A complete 14 point review of systems was performed and is as dictated above, otherwise negative.     Past Medical History:   Diagnosis Date    Anxiety     per old chart hx of panic attacks    Atrial fibrillation (HCC)     Atrial fibrillation with normal ventricular rate ([facility] [address] 2015    CAD (coronary artery disease)     follows with Dr Kira Goss  Cataracts, bilateral     COPD (chronic obstructive pulmonary disease) (HCC)     follows with Dr Anna Estrada Depression     Diabetes mellitus [facility])     dx 1's per old chart    Hyperlipidemia     Hypertension     Pleural effusion     scheduled for thoracentesis 9/11/2017  has had fluid drained off lung 4 times since CABG    Seizure [facility])     \"had a diabetic seizure 16 hrs ago- from low sugar\"    Thyroid disease     Wears dentures     full  upper plate    Wears glasses        Past Surgical History:   Procedure Laterality Date    ABDOMEN SURGERY      per old chart pt had exp. laparotomy and appy done 1966\"the appendix was wrapped around the colon\"   [address] CATHETERIZATION  07/20/2017    Dr. Ethel Saba. Diagnostic only, no intervention done.  CARDIOVERSION      CHOLECYSTECTOMY      1998    COLONOSCOPY  04/2010    CORONARY ARTERY BYPASS GRAFT  07/25/2017    [place] to L[place]. Done per Dr. Dipti Romero with mitral valve repair     MITRAL VALVE SURGERY  07/25/2017    MV repair with ring     OTHER SURGICAL HISTORY      per old chart pt had exc. fistula anal canal with Dr Fabio Rodriguez done 4/2010    PACEMAKER PLACEMENT  06/13/2018    THORACOSCOPY Left 12/07/2017    with decortication     TUBAL LIGATION  age 43's       Family History   Problem Relation Age of Onset    Alcohol Abuse Father     Breast Cancer Sister     Alcohol Abuse Sister     Alcohol Abuse Brother     Cancer Brother         prostate    Alcohol Abuse Brother     Alcohol Abuse Brother     Alcohol Abuse Brother     Alcohol Abuse Brother        Social History     Socioeconomic History    Marital status:       Spouse name: Not on file    Number of children: Not on file    Years of education: Not on file    Highest education level: Not on file   Occupational History    Not on file   Social Needs    Financial resource strain: Not on file    Food insecurity Worry: Not on file     Inability: Not on file    Transportation needs     Medical: Not on file     Non-medical: Not on file   Tobacco Use    Smoking status: Former Smoker     Packs/day: 0.50     Years: 17.00     Pack years: 8.50     Types: Cigarettes     Quit date: 2001     Years since quittin.9    Smokeless tobacco: Never Used   Substance and Sexual Activity    Alcohol use: No    Drug use: No    Sexual activity: Not on file   Lifestyle    Physical activity     Days per week: Not on file     Minutes per session: Not on file    Stress: Not on file   Relationships    Social connections     Talks on phone: Not on file     Gets together: Not on file     Attends Taoist service: Not on file     Active member of club or organization: Not on file     Attends meetings of clubs or organizations: Not on file     Relationship status: Not on file    Intimate partner violence     Fear of current or ex partner: Not on file     Emotionally abused: Not on file     Physically abused: Not on file     Forced sexual activity: Not on file   Other Topics Concern    Not on file   Social History Narrative    Not on file       No current facility-administered medications for this encounter.       Current Outpatient Medications   Medication Sig Dispense Refill    atorvastatin (LIPITOR) 20 MG tablet Take 1 tablet by mouth nightly 30 tablet 3    metoprolol tartrate (LOPRESSOR) 25 MG tablet Take 1 tablet by mouth 2 times daily 60 tablet 3    FLUoxetine (PROZAC) 20 MG capsule Take 1 capsule by mouth daily 30 capsule 0    furosemide (LASIX) 40 MG tablet Take 1 tablet by mouth 2 times daily 60 tablet 0    spironolactone (ALDACTONE) 25 MG tablet Take 1 tablet by mouth 2 times daily (Patient taking differently: Take 100 mg by mouth 2 times daily ) 60 tablet 0    warfarin (COUMADIN) 2.5 MG tablet Take one tablet in the evening every day Please check INR tomorrow at 1/16/2018 with  office and dose of coumadin may need to be adjusted (Patient taking differently: 2.5 mg daily except on Thursdays she takes 5 mg) 30 tablet 0    sotalol (BETAPACE) 80 MG tablet Take 0.5 tablets by mouth 2 times daily 60 tablet 3    levETIRAcetam (KEPPRA) 750 MG tablet TK  1 T PO BID  2    metFORMIN (GLUCOPHAGE) 1000 MG tablet 750 mg  4    glimepiride (AMARYL) 2 MG tablet Take 1 mg by mouth every morning (before breakfast)       aspirin 81 MG EC tablet Take 1 tablet by mouth daily 30 tablet 3    ipratropium-albuterol (DUONEB) 0.5-2.5 (3) MG/3ML SOLN nebulizer solution Take 1 vial by nebulization every 4 hours as needed       budesonide-formoterol (SYMBICORT) 160-4.5 MCG/ACT AERO Inhale 2 puffs into the lungs 2 times daily 1 Inhaler 5    linagliptin (TRADJENTA) 5 MG tablet Take 5 mg by mouth daily      LORazepam (ATIVAN) 1 MG tablet Take 1 mg by mouth every 6 hours as needed for Anxiety Up to 4 a day.  levothyroxine (SYNTHROID) 137 MCG tablet Take 125 mcg by mouth Daily          Allergies   Allergen Reactions    Ceclor [Cefaclor] Hives    Tape Jacob Ida Tape] Rash     Paper tape ok       Nursing Notes Reviewed      Physical Exam:  Triage VS:    ED Triage Vitals [02/04/21 1044]   Enc Vitals Group      BP (!) 154/56      Pulse 64      Resp 20      Temp 98.4 °F (36.9 °C)      Temp Source Oral      SpO2 97 %      Weight 180 lb (81.6 kg)      Height 5' 6\" (1.676 m)      My pulse ox interpretation is   WNL    Primary exam:  Airway: Intact. Speaking in normal voice. Breathing: Spontaneous. Equal chest rise and breath sounds. Circulation: Heart RRR. Pulses 2+. Secondary exam:   GENERAL APPEARANCE: Awake and alert. Cooperative. No acute distress. Following commands and answering questions. GCS is 15. Nontoxic in appearance. PERFUSION:  pulses intact and equal in all extremities. Brisk capillary refill.        I have reviewed and interpreted all of the currently available diagnostic results from this visit:    Labs:  Results for orders placed or performed during the hospital encounter of 02/04/21   CBC Auto Differential   Result Value Ref Range    WBC 8.7 4.0 - 10.5 K/CU MM    RBC 5.00 4.2 - 5.4 M/CU MM    Hemoglobin 16.1 (H) 12.5 - 16.0 GM/DL    Hematocrit 48.8 (H) 37 - 47 %    MCV 97.6 78 - 100 FL    MCH 32.2 (H) 27 - 31 PG    MCHC 33.0 32.0 - 36.0 %    RDW 13.1 11.7 - 14.9 %    Platelets 733 906 - 983 K/CU MM    MPV 10.0 7.5 - 11.1 FL    Differential Type AUTOMATED DIFFERENTIAL     Segs Relative 78.4 (H) 36 - 66 %    Lymphocytes % 11.5 (L) 24 - 44 %    Monocytes % 5.6 (H) 0 - 4 %    Eosinophils % 2.1 0 - 3 %    Basophils % 0.9 0 - 1 %    Segs Absolute 6.8 K/CU MM    Lymphocytes Absolute 1.0 K/CU MM    Monocytes Absolute 0.5 K/CU MM    Eosinophils Absolute 0.2 K/CU MM    Basophils Absolute 0.1 K/CU MM    Immature Neutrophil % 1.5 (H) 0 - 0.43 %    Total Immature Neutrophil 0.13 K/CU MM   Basic Metabolic Panel w/ Reflex to MG   Result Value Ref Range    Sodium 138 135 - 145 MMOL/L    Potassium 4.3 3.5 - 5.1 MMOL/L    Chloride 99 99 - 110 mMol/L    CO2 33 (H) 21 - 32 MMOL/L    Anion Gap 6 4 - 16    BUN 11 6 - 23 MG/DL    CREATININE 1.0 0.6 - 1.1 MG/DL    Glucose 243 (H) 70 - 99 MG/DL    Calcium 9.7 8.3 - 10.6 MG/DL    GFR Non- 54 (L) >60 mL/min/1.73m2    GFR African American >60 >60 mL/min/1.73m2   PT - INR   Result Value Ref Range    Protime 17.4 (H) 11.7 - 14.5 SECONDS    INR 1.51 INDEX          Radiographs:  Xr Chest (2 Vw)    Result Date: 2/4/2021 EXAMINATION: TWO XRAY VIEWS OF THE CHEST 2/4/2021 11:15 am COMPARISON: 04/12/2019 HISTORY: ORDERING SYSTEM PROVIDED HISTORY: fall TECHNOLOGIST PROVIDED HISTORY: Reason for exam:->fall Additional signs and symptoms: fall FINDINGS: A permanent left-sided AV sequential bipolar pacemaker was noted. Postoperative changes were noted from prior sternotomy with prosthetic mitral valve. Tortuosity of the descending aorta was noted. The heart was at the upper limits of normal for size with left atrial enlargement. Pleural thickening blunts the left costophrenic angle. Aneurysmal dilatation of the descending thoracic aorta was noted. No pneumonia, interstitial edema or hilar mass were noted. Permanent left-sided AV sequential bipolar pacemaker. Postoperative changes were noted from prior sternotomy with prosthetic mitral valve. The heart was at the upper limits of normal for size with left atrial enlargement. Pleural thickening blunts the left costophrenic angle. Aneurysmal dilatation of the descending thoracic aorta was noted measuring 3.9 centimetres. No pneumonia or interstitial edema.      Ct Head Wo Contrast    Result Date: 2/4/2021 EXAMINATION: CT OF THE HEAD WITHOUT CONTRAST,  2/4/2021 11:16 am TECHNIQUE: CT of the head was performed without the administration of intravenous contrast. Dose modulation, iterative reconstruction, and/or weight based adjustment of the mA/kV was utilized to reduce the radiation dose to as low as reasonably achievable. COMPARISON: Head CT 06/02/2018 and brain MRI 06/05/2018. HISTORY: ORDERING SYSTEM PROVIDED HISTORY: Fall TECHNOLOGIST PROVIDED HISTORY: Reason for exam:  Fall Has a \"code stroke\" or \"stroke alert\" been called? No Decision Support Exception:  Emergency Medical Condition ([place]) Additional signs and symptoms:  Fell backward on ice, pt states on blood thinner. FINDINGS: BRAIN/VENTRICLES:  There is no acute intracranial hemorrhage, mass effect or midline shift. No abnormal extra-axial fluid collection. No evidence for acute infarct. Stable scattered areas of encephalomalacia supratentorial bilaterally with involvement of right frontal, right parietal and bilateral occipital lobes compatible with areas of old infarct. There is prominence of the ventricles and sulci due to global parenchymal volume loss. There are nonspecific areas of hypoattenuation within the periventricular and subcortical white matter, which likely represent chronic microvascular ischemic change. ORBITS: The visualized portion of the orbits demonstrate no acute abnormality. SINUSES: The visualized paranasal sinuses and mastoid air cells demonstrate no acute abnormality. SOFT TISSUES/SKULL: Large left posterior parietal scalp hematoma with likely superimposed laceration and some foci of soft tissue gas. No acute bony abnormality. No acute intracranial abnormality. Large left posterior parietal scalp hematoma with likely superimposed laceration and some foci of soft tissue gas. No acute bony abnormality. Stable findings intracranial with age related atrophy, chronic small vessel ischemic change along with multiple foci of prior infarct as noted above. Ct Cervical Spine Wo Contrast    Result Date: 2/4/2021  EXAMINATION: CT OF THE CERVICAL SPINE WITHOUT CONTRAST 2/4/2021 11:16 am TECHNIQUE: CT of the cervical spine was performed without the administration of intravenous contrast. Multiplanar reformatted images are provided for review. Dose modulation, iterative reconstruction, and/or weight based adjustment of the mA/kV was utilized to reduce the radiation dose to as low as reasonably achievable. COMPARISON: None. HISTORY: ORDERING SYSTEM PROVIDED HISTORY: fall TECHNOLOGIST PROVIDED HISTORY: Reason for exam:->fall Decision Support Exception->Emergency Medical Condition ([place]) Additional signs and symptoms: fell backwards on ice FINDINGS: BONES/ALIGNMENT: There is no acute fracture or traumatic malalignment. DEGENERATIVE CHANGES: Multilevel degenerative changes. SOFT TISSUES: There is no prevertebral soft tissue swelling. No acute abnormality of the cervical spine.          MDM: Patient was seen and evaluated in the emergency department by myself. Trauma alert activated. A thorough history and physical exam were performed, prior medical records reviewed. Upon arrival patient's vital signs were noted and were stable. ATLS protocol was followed. Airway was assessed and was able to protect airway without difficulty in breathing. Breath sounds were auscultated bilaterally with symmetric chest rise. Circulation was intact with strong pulses in the upper and lower extremities. Compartments are soft and compressible. No signs of compartment syndrome. Capillary refill is brisk and less than 2 seconds. Disability status was assessed. Patient is awake, alert, oriented. Following commands and answering questions. GCS is 15. Patient was fully exposed and rolled to the side. No tenderness to palpation at the midline of the C-spine, T-spine, L-spine. No step-offs or deformities. No clinical signs of cauda equina syndrome. Risks discussed:  Infection, pain and need for additional repair    Alternatives discussed:  No treatment  Anesthesia (see MAR for exact dosages): Anesthesia method:  None  Laceration details:     Location:  Scalp    Scalp location:  L parietal    Length (cm):  0.5    Depth (mm):  1  Repair type:     Repair type:  Simple  Pre-procedure details:     Preparation:  Patient was prepped and draped in usual sterile fashion  Exploration:     Wound exploration: entire depth of wound probed and visualized      Wound extent: no nerve damage noted, no tendon damage noted, no underlying fracture noted and no vascular damage noted    Treatment:     Amount of cleaning:  Standard    Irrigation solution:  Sterile saline    Irrigation method:  Syringe    Visualized foreign bodies/material removed: no    Skin repair:     Repair method:  Staples    Number of staples:  1  Approximation:     Approximation:  Close  Post-procedure details:     Dressing:  Open (no dressing)    Patient tolerance of procedure:   Tolerated well, no immediate complications Patient tolerates procedure well without any complications. Is now requesting discharge. Results of laboratory and radiographic data along with differential diagnosis and treatment plan were discussed. Patient presents as post fall from standing with blunt head trauma. She does have a posterior scalp laceration with a left posterior parietal scalp hematoma. She does take Coumadin, however, INR is subtherapeutic at 1.5. She is instructed that she may need an increased dose. Instructed to follow-up with her primary care provider Dr. Per Villareal for repeat INR check. Incidental finding of 3.9 cm descending thoracic aortic aneurysm is also discussed with the patient. She is instructed to follow-up with her cardiac surgeon Dr. Elizabeth Becerril. Instructed to return to the emergency department immediately with any new, worsening, concerning symptoms. Additional verbal and printed discharge instructions were provided. Patient verbalizes understanding and is agreeable with discharge plan. Patient is discharged in stable, ambulatory condition. Clinical Impression:  1. Fall from standing, initial encounter    2. Blunt head trauma, initial encounter    3. 0.5 cm posterior scalp laceration, initial encounter    4. Left parietal scalp hematoma, initial encounter    5. Warfarin-induced coagulopathy (HCC)    6. Subtherapeutic international normalized ratio (INR)    7. 3.9 cm aneurysm of descending thoracic aorta ([address]        Procedures:  ATLS protocol      Disposition referral:  Yolis Cordova MD  [address]  [address] [facility]  [phone]    In 2 days  Plese follow-up with your primary care provider for re-evaluation and staple removal. We recommend increased coumadin dose and repeat INR check. [facility] Emergency Department  [address] [place] [id number] [phone]  Go to   Immediately with any new, worsening, concerning symptoms.

## 2021-03-17 ENCOUNTER — HOSPITAL ENCOUNTER (OUTPATIENT)
Age: 76
Discharge: HOME | End: 2021-03-17
Payer: MEDICARE

## 2021-03-17 LAB
ANION GAP SERPL CALCULATED.3IONS-SCNC: 13 MMOL/L (ref 4–16)
BUN BLDV-MCNC: 21 MG/DL (ref 6–23)
CALCIUM SERPL-MCNC: 9.9 MG/DL (ref 8.3–10.6)
CHLORIDE BLD-SCNC: 98 MMOL/L (ref 99–110)
CO2: 28 MMOL/L (ref 21–32)
CREAT SERPL-MCNC: 1.2 MG/DL (ref 0.6–1.1)
GFR AFRICAN AMERICAN: 53 ML/MIN/1.73M2
GFR NON-AFRICAN AMERICAN: 44 ML/MIN/1.73M2
GLUCOSE BLD-MCNC: 222 MG/DL (ref 70–99)
POTASSIUM SERPL-SCNC: 5.1 MMOL/L (ref 3.5–5.1)
SODIUM BLD-SCNC: 139 MMOL/L (ref 135–145)

## 2021-03-17 PROCEDURE — 80048 BASIC METABOLIC PNL TOTAL CA: CPT

## 2021-03-17 PROCEDURE — 36415 COLL VENOUS BLD VENIPUNCTURE: CPT

## 2021-03-22 ENCOUNTER — HOSPITAL ENCOUNTER (OUTPATIENT)
Dept: CT IMAGING | Age: 76
Discharge: HOME | End: 2021-03-22
Payer: MEDICARE

## 2021-03-22 DIAGNOSIS — I71.21 ASCENDING AORTIC ANEURYSM: ICD-10-CM

## 2021-03-22 LAB
GFR AFRICAN AMERICAN: 53 ML/MIN/1.73M2
GFR NON-AFRICAN AMERICAN: 44 ML/MIN/1.73M2
POC CREATININE: 1.2 MG/DL (ref 0.6–1.1)

## 2021-03-22 PROCEDURE — 6360000004 HC RX CONTRAST MEDICATION: Performed by: THORACIC SURGERY (CARDIOTHORACIC VASCULAR SURGERY)

## 2021-03-22 PROCEDURE — 71275 CT ANGIOGRAPHY CHEST: CPT

## 2021-03-22 PROCEDURE — 2580000003 HC RX 258: Performed by: THORACIC SURGERY (CARDIOTHORACIC VASCULAR SURGERY)

## 2021-03-22 PROCEDURE — 74174 CTA ABD&PLVS W/CONTRAST: CPT

## 2021-03-22 RX ORDER — SODIUM CHLORIDE 0.9 % (FLUSH) 0.9 %
10 SYRINGE (ML) INJECTION PRN
Status: DISCONTINUED | OUTPATIENT
Start: 2021-03-22 | End: 2021-03-23 | Stop reason: HOSPADM

## 2021-03-22 RX ADMIN — IOPAMIDOL 95 ML: 755 INJECTION, SOLUTION INTRAVENOUS at 11:00

## 2021-03-22 RX ADMIN — SODIUM CHLORIDE, PRESERVATIVE FREE 10 ML: 5 INJECTION INTRAVENOUS at 11:00

## 2021-07-15 ENCOUNTER — HOSPITAL ENCOUNTER (EMERGENCY)
Age: 76
Discharge: HOME | End: 2021-07-15
Attending: EMERGENCY MEDICINE
Payer: MEDICARE

## 2021-07-15 VITALS
WEIGHT: 178 LBS | HEART RATE: 67 BPM | OXYGEN SATURATION: 98 % | TEMPERATURE: 98.3 F | DIASTOLIC BLOOD PRESSURE: 55 MMHG | RESPIRATION RATE: 16 BRPM | BODY MASS INDEX: 28.61 KG/M2 | HEIGHT: 66 IN | SYSTOLIC BLOOD PRESSURE: 141 MMHG

## 2021-07-15 DIAGNOSIS — G60.3 IDIOPATHIC PROGRESSIVE NEUROPATHY: Primary | ICD-10-CM

## 2021-07-15 PROCEDURE — 99283 EMERGENCY DEPT VISIT LOW MDM: CPT

## 2021-07-15 RX ORDER — ALLOPURINOL 100 MG/1
100 TABLET ORAL DAILY
COMMUNITY
Start: 2021-07-08

## 2021-07-15 RX ORDER — LACTULOSE 10 G/15ML
15 SOLUTION ORAL DAILY
COMMUNITY
Start: 2020-12-28

## 2021-07-15 RX ORDER — PREGABALIN 75 MG/1
75 CAPSULE ORAL 3 TIMES DAILY
Qty: 90 CAPSULE | Refills: 0 | Status: ON HOLD | OUTPATIENT
Start: 2021-07-15 | End: 2022-06-27 | Stop reason: HOSPADM

## 2021-07-15 RX ORDER — POTASSIUM CHLORIDE 750 MG/1
10 CAPSULE, EXTENDED RELEASE ORAL DAILY
COMMUNITY
Start: 2021-07-08

## 2021-07-15 NOTE — ED PROVIDER NOTES
Emergency Department Encounter  Location: [place] [facility] [address] [place] [address]    Patient: Brittany Mackey  MRN: [id number]  : 1945  Date of evaluation: 7/15/2021  ED Provider: Hanna Bello DO, FACEP    Chief Complaint:    Numbness (Patient ambulatory to room 5 with c/o right foot numbness for 3 weeks, PCP had her on gabapentin with no relief, saw her pcp again approx 1 week ago to let her know that it wasn't working and was supposed to set up a nerve conduction test but has not gotten a call back to do so, states that the numbness is starting to spread up leg and she is now having difficulty sleeping)    [place]:  Brittany Mackey is a 77 y.o. female that presents to the emergency department with complaints of numbness in her right leg that is progressive. She states she noticed numbness in the bottom of her foot and her big toe that started about 3 weeks ago. She states now is coming up the front of her leg. She saw her primary care doctor who started her on gabapentin but that did not seem to help. She followed up again with her primary doctor who took her off of the gabapentin and was to order a nerve conduction test however this has not been set up. The patient is called but states that the office has not called her back to get the appointment set up. She presents to the emergency department today because she is very anxious about the progression as it seems to be coming up the front of her shin. She is denying pain but states it is \"driving me crazy\". She is quite anxious about what is going on. She states that she does have some trepidation and feels like she may fall because she is having proprioception's issues with her right leg. ROS:  At least 4 systems reviewed and otherwise acutely negative except as in the [address]   Negative for fever or chills  Negative for chest pain  Negative for shortness of breath  Negative for nausea vomiting diarrhea or constipation    Past Medical History: Diagnosis Date    Anxiety     per old chart hx of panic attacks    Atrial fibrillation (HCC)     Atrial fibrillation with normal ventricular rate ([facility] 11/2015    CAD (coronary artery disease)     follows with Dr Sheliah Kawasaki, bilateral     COPD (chronic obstructive pulmonary disease) ([facility]     follows with Dr Delilah Hall Depression     Diabetes mellitus [facility])     dx 1's per old chart    Hyperlipidemia     Hypertension     Liver cirrhosis ([facility]     Pleural effusion     scheduled for thoracentesis 9/11/2017  has had fluid drained off lung 4 times since CABG    Seizure ([facility]     \"had a diabetic seizure 16 hrs ago- from low sugar\"    Thyroid disease     Wears dentures     full  upper plate    Wears glasses      Past Surgical History:   Procedure Laterality Date    ABDOMEN SURGERY      per old chart pt had exp. laparotomy and appy done 1966\"the appendix was wrapped around the colon\"   [address] CATHETERIZATION  07/20/2017    Dr. Shreya Flowers. Diagnostic only, no intervention done.  CARDIOVERSION      CHOLECYSTECTOMY      1998    COLONOSCOPY  04/2010    CORONARY ARTERY BYPASS GRAFT  07/25/2017    [place] to [facility]. Done per Dr. Dolly Smith with mitral valve repair     MITRAL VALVE SURGERY  07/25/2017    MV repair with ring     OTHER SURGICAL HISTORY      per old chart pt had exc. fistula anal canal with Dr Santa Townsend done 4/2010    PACEMAKER PLACEMENT  06/13/2018    THORACOSCOPY Left 12/07/2017    with decortication     TUBAL LIGATION  age 43's     Family History   Problem Relation Age of Onset    Alcohol Abuse Father     Breast Cancer Sister     Alcohol Abuse Sister     Alcohol Abuse Brother     Cancer Brother         prostate    Alcohol Abuse Brother     Alcohol Abuse Brother     Alcohol Abuse Brother     Alcohol Abuse Brother      Social History     Socioeconomic History    Marital status:       Spouse name: Not on file    Number of children: Not on file    Years of education: Not on file    Highest education level: Not on file   Occupational History    Not on file   Tobacco Use    Smoking status: Former Smoker     Packs/day: 0.50     Years: 17.00     Pack years: 8.50     Types: Cigarettes     Quit date: 2001     Years since quittin.4    Smokeless tobacco: Never Used   Vaping Use    Vaping Use: Former   Substance and Sexual Activity    Alcohol use: No    Drug use: No    Sexual activity: Not on file   Other Topics Concern    Not on file   Social History Narrative    Not on file     Social Determinants of Health     Financial Resource Strain:     Difficulty of Paying Living Expenses:    Food Insecurity:     Worried About [address] in the Last Year:     [address] in the Last Year:    Transportation Needs:     Lack of Transportation (Medical):  Lack of Transportation (Non-Medical):    Physical Activity:     Days of Exercise per Week:     Minutes of Exercise per Session:    Stress:     Feeling of Stress :    Social Connections:     Frequency of Communication with Friends and Family:     Frequency of Social Gatherings with Friends and Family:     Attends Worship Services:     Active Member of Clubs or Organizations:     Attends Club or Organization Meetings:     Marital Status:    Intimate Partner Violence:     Fear of Current or Ex-Partner:     Emotionally Abused:     Physically Abused:     Sexually Abused:      No current facility-administered medications for this encounter.      Current Outpatient Medications   Medication Sig Dispense Refill    allopurinol (ZYLOPRIM) 100 MG tablet Take 100 mg by mouth daily      lactulose (CHRONULAC) 10 GM/15ML solution Take 15 mLs by mouth daily      potassium chloride (MICRO-K) 10 MEQ extended release capsule Take 10 mEq by mouth daily      rifaximin (XIFAXAN) 550 MG tablet Take 1 tablet by mouth 2 times daily      BP (!) 141/55      Pulse 67      Resp 16      Temp 98.3 °F (36.8 °C)      Temp Source Oral      SpO2 98 %      Weight 178 lb (80.7 kg)      Height 5' 6\" (1.676 m)      Head Circumference       Peak Flow       Pain Score       Pain Loc       Pain Edu? Excl. in [address]? GENERAL APPEARANCE: Awake and alert. Cooperative. No acute distress. Nontoxic in appearance  HEAD: Normocephalic. Atraumatic. EYES: Sclera anicteric. ENT: Tolerates saliva. NECK: Supple. Trachea midline. LUNGS: Respirations unlabored. EXTREMITIES: No acute deformities. SKIN: Warm and dry. NEUROLOGICAL: No gross facial drooping. Focused examination of her right lower extremity reveals good pulses in her right foot. The foot is pink and does not appear to be cold and does not appear to be pale. She can feel me use a tuning fork on her medial and lateral malleoli regions but it is much decreased as compared to the left. She is able to move her foot without deficits. There is no numbness or tingling on the posterior calf. The subjective numbness does move upward to about midway on her anterior shin. PSYCHIATRIC: Normal mood. Labs:  No results found for this visit on 07/15/21. Radiographs (if obtained):  [] The following radiograph was interpreted by myself in the absence of a radiologist:  [x] Radiologist's Report reviewed at time of ED visit:  No orders to display       ED Course and MDM:  Patient most likely has diabetic neuropathy. She will be started on Lyrica to see if that helps with her discomfort. She is requesting referral to her podiatrist Dr. Va Lenz. This referral will be made. She is discharged in stable condition and is instructed to follow-up with her primary caregiver and her podiatrist for recheck. She is instructed return for any problems or concerns. Final Impression:  1.  Idiopathic progressive neuropathy      DISPOSITION Decision To Discharge    Patient referred to:  BRANDO Rankin [address] [facility] [id number] [phone]    Schedule an appointment as soon as possible for a visit in 1 week  For follow up    Discharge medications:  New Prescriptions    PREGABALIN (LYRICA) 75 MG CAPSULE    Take 1 capsule by mouth 3 times daily for 30 days.      (Please note that portions of this note may have been completed with a voice recognition program. Efforts were made to edit the dictations but occasionally words are mis-transcribed.)    Deepali Howell DO, [facility]  Board certified in [address] [place] [address]  07/15/21 6249

## 2021-10-20 ENCOUNTER — APPOINTMENT (OUTPATIENT)
Dept: ULTRASOUND IMAGING | Age: 76
End: 2021-10-20
Payer: MEDICARE

## 2021-10-20 ENCOUNTER — HOSPITAL ENCOUNTER (EMERGENCY)
Age: 76
Discharge: HOME | End: 2021-10-20
Payer: MEDICARE

## 2021-10-20 ENCOUNTER — APPOINTMENT (OUTPATIENT)
Dept: GENERAL RADIOLOGY | Age: 76
End: 2021-10-20
Payer: MEDICARE

## 2021-10-20 VITALS
WEIGHT: 175 LBS | HEIGHT: 66 IN | DIASTOLIC BLOOD PRESSURE: 65 MMHG | TEMPERATURE: 96.3 F | RESPIRATION RATE: 18 BRPM | HEART RATE: 79 BPM | SYSTOLIC BLOOD PRESSURE: 146 MMHG | OXYGEN SATURATION: 96 % | BODY MASS INDEX: 28.12 KG/M2

## 2021-10-20 DIAGNOSIS — M79.604 PAIN AND SWELLING OF RIGHT LOWER EXTREMITY: Primary | ICD-10-CM

## 2021-10-20 DIAGNOSIS — Z79.01 ANTICOAGULATED ON COUMADIN: ICD-10-CM

## 2021-10-20 DIAGNOSIS — M79.89 PAIN AND SWELLING OF RIGHT LOWER EXTREMITY: Primary | ICD-10-CM

## 2021-10-20 LAB
BASOPHILS ABSOLUTE: 0.1 K/CU MM
BASOPHILS RELATIVE PERCENT: 0.7 % (ref 0–1)
DIFFERENTIAL TYPE: ABNORMAL
EOSINOPHILS ABSOLUTE: 0.2 K/CU MM
EOSINOPHILS RELATIVE PERCENT: 2.4 % (ref 0–3)
HCT VFR BLD CALC: 44 % (ref 37–47)
HEMOGLOBIN: 14 GM/DL (ref 12.5–16)
IMMATURE NEUTROPHIL %: 1.2 % (ref 0–0.43)
INR BLD: 3.59 INDEX
LYMPHOCYTES ABSOLUTE: 0.9 K/CU MM
LYMPHOCYTES RELATIVE PERCENT: 13.7 % (ref 24–44)
MCH RBC QN AUTO: 32.1 PG (ref 27–31)
MCHC RBC AUTO-ENTMCNC: 31.8 % (ref 32–36)
MCV RBC AUTO: 100.9 FL (ref 78–100)
MONOCYTES ABSOLUTE: 0.6 K/CU MM
MONOCYTES RELATIVE PERCENT: 8.7 % (ref 0–4)
PDW BLD-RTO: 13.7 % (ref 11.7–14.9)
PLATELET # BLD: 150 K/CU MM (ref 140–440)
PMV BLD AUTO: 10.4 FL (ref 7.5–11.1)
PROTHROMBIN TIME: 45.4 SECONDS (ref 11.7–14.5)
RBC # BLD: 4.36 M/CU MM (ref 4.2–5.4)
SEGMENTED NEUTROPHILS ABSOLUTE COUNT: 5 K/CU MM
SEGMENTED NEUTROPHILS RELATIVE PERCENT: 73.3 % (ref 36–66)
TOTAL IMMATURE NEUTOROPHIL: 0.08 K/CU MM
WBC # BLD: 6.8 K/CU MM (ref 4–10.5)

## 2021-10-20 PROCEDURE — 93971 EXTREMITY STUDY: CPT

## 2021-10-20 PROCEDURE — 85610 PROTHROMBIN TIME: CPT

## 2021-10-20 PROCEDURE — 85025 COMPLETE CBC W/AUTO DIFF WBC: CPT

## 2021-10-20 PROCEDURE — 99283 EMERGENCY DEPT VISIT LOW MDM: CPT

## 2021-10-20 PROCEDURE — 6370000000 HC RX 637 (ALT 250 FOR IP): Performed by: EMERGENCY MEDICINE

## 2021-10-20 PROCEDURE — 73610 X-RAY EXAM OF ANKLE: CPT

## 2021-10-20 RX ORDER — HYDROCODONE BITARTRATE AND ACETAMINOPHEN 5; 325 MG/1; MG/1
1 TABLET ORAL EVERY 6 HOURS PRN
Qty: 10 TABLET | Refills: 0 | Status: SHIPPED | OUTPATIENT
Start: 2021-10-20 | End: 2021-10-23

## 2021-10-20 RX ORDER — CLINDAMYCIN HYDROCHLORIDE 300 MG/1
300 CAPSULE ORAL 3 TIMES DAILY
Qty: 30 CAPSULE | Refills: 0 | Status: SHIPPED | OUTPATIENT
Start: 2021-10-20 | End: 2021-10-20 | Stop reason: SDUPTHER

## 2021-10-20 RX ORDER — HYDROCODONE BITARTRATE AND ACETAMINOPHEN 5; 325 MG/1; MG/1
1 TABLET ORAL ONCE
Status: COMPLETED | OUTPATIENT
Start: 2021-10-20 | End: 2021-10-20

## 2021-10-20 RX ORDER — CLINDAMYCIN HYDROCHLORIDE 300 MG/1
300 CAPSULE ORAL 3 TIMES DAILY
Qty: 30 CAPSULE | Refills: 0 | Status: SHIPPED | OUTPATIENT
Start: 2021-10-20 | End: 2021-10-30

## 2021-10-20 RX ORDER — HYDROCODONE BITARTRATE AND ACETAMINOPHEN 5; 325 MG/1; MG/1
1 TABLET ORAL EVERY 6 HOURS PRN
Qty: 10 TABLET | Refills: 0 | Status: SHIPPED | OUTPATIENT
Start: 2021-10-20 | End: 2021-10-20 | Stop reason: SDUPTHER

## 2021-10-20 RX ADMIN — HYDROCODONE BITARTRATE AND ACETAMINOPHEN 1 TABLET: 5; 325 TABLET ORAL at 11:03

## 2021-10-20 ASSESSMENT — PAIN SCALES - GENERAL
PAINLEVEL_OUTOF10: 8
PAINLEVEL_OUTOF10: 8

## 2021-10-20 ASSESSMENT — PAIN DESCRIPTION - DESCRIPTORS: DESCRIPTORS: ACHING;CONSTANT;DISCOMFORT

## 2021-10-20 ASSESSMENT — PAIN DESCRIPTION - PAIN TYPE: TYPE: ACUTE PAIN;CHRONIC PAIN

## 2021-10-20 ASSESSMENT — PAIN DESCRIPTION - ORIENTATION: ORIENTATION: RIGHT

## 2021-10-20 ASSESSMENT — PAIN DESCRIPTION - LOCATION: LOCATION: FOOT

## 2021-10-20 NOTE — ED PROVIDER NOTES
Emergency Department Encounter  Location: [place] At [address] [place] [address]    Patient: Roxi Castro  MRN: [id number]  : 1945  Date of evaluation: 10/20/2021  ED Provider: Andrea Gastelum DO, FACEP    Chief Complaint:    Foot Pain (c/ o increased pains to rt for 2 days. Pt states painher doctor told her it was nerve pain do to her diabetes)    [place]:  Roxi Castro is a 77 y.o. female that presents to the emergency department with complaints of increasing pain and swelling redness to her right foot for the past 2 days. Patient states she is having pain. It was keeping her awake last night. She is having pain on the bottom of her foot and swelling around her ankle. She has a sore present on her anterior shin that is been present for the past 2 weeks. She states this is healing well and seems to be doing better. She denies fever or chills. She denies shortness of breath unless she is upset. She took Tylenol with minimal relief. She is concerned that she may have a blood clot in spite of using her warfarin. Her last INR was 2-1/2 weeks ago which showed her 2.5.    ROS:  At least 4 systems reviewed and otherwise acutely negative except as in the [address]   Negative for fever or chills  Negative for chest pain  Negative for shortness of breath  Negative for nausea vomiting diarrhea or constipation    Past Medical History:   Diagnosis Date    Anxiety     per old chart hx of panic attacks    Atrial fibrillation ([address]     Atrial fibrillation with normal ventricular rate ([address] 2015    CAD (coronary artery disease)     follows with Dr Rosalee Hart, bilateral     COPD (chronic obstructive pulmonary disease) ([address]     follows with Dr Gabino Richards Depression     Diabetes mellitus [facility])     dx 1's per old chart    Hyperlipidemia     Hypertension     Liver cirrhosis ([facility]     Pleural effusion     scheduled for thoracentesis 2017  has had fluid drained off lung 4 times since CABG    Seizure ([address]     \"had a diabetic seizure 16 hrs ago- from low sugar\"    Thyroid disease     Wears dentures     full  upper plate    Wears glasses      Past Surgical History:   Procedure Laterality Date    ABDOMEN SURGERY      per old chart pt had exp. laparotomy and appy done \"the appendix was wrapped around the colon\"   [address] CATHETERIZATION  2017    Dr. Filippo Umaña. Diagnostic only, no intervention done.  CARDIOVERSION      CHOLECYSTECTOMY          COLONOSCOPY  2010    CORONARY ARTERY BYPASS GRAFT  2017    [place] to [facility]. Done per Dr. Sheila Le with mitral valve repair     MITRAL VALVE SURGERY  2017    MV repair with ring     OTHER SURGICAL HISTORY      per old chart pt had exc. fistula anal canal with Dr Carrie Freitas done 2010    PACEMAKER PLACEMENT  2018    THORACOSCOPY Left 2017    with decortication     TUBAL LIGATION  age 43's     Family History   Problem Relation Age of Onset    Alcohol Abuse Father     Breast Cancer Sister     Alcohol Abuse Sister     Alcohol Abuse Brother     Cancer Brother         prostate    Alcohol Abuse Brother     Alcohol Abuse Brother     Alcohol Abuse Brother     Alcohol Abuse Brother      Social History     Socioeconomic History    Marital status:       Spouse name: Not on file    Number of children: Not on file    Years of education: Not on file    Highest education level: Not on file   Occupational History    Not on file   Tobacco Use    Smoking status: Former Smoker     Packs/day: 0.50     Years: 17.00     Pack years: 8.50     Types: Cigarettes     Quit date: 2001     Years since quittin.6    Smokeless tobacco: Never Used   Vaping Use    Vaping Use: Former   Substance and Sexual Activity    Alcohol use: No    Drug use: No    Sexual activity: Not on file   Other Topics Concern    Not on file   Social History Narrative    Not on file     Social Determinants of Health     Financial Resource Strain:     Difficulty of Paying Living Expenses:    Food Insecurity:     Worried About Running Out of Food in the Last Year:     [address] in the Last Year:    Transportation Needs:     Lack of Transportation (Medical):  Lack of Transportation (Non-Medical):    Physical Activity:     Days of Exercise per Week:     Minutes of Exercise per Session:    Stress:     Feeling of Stress :    Social Connections:     Frequency of Communication with Friends and Family:     Frequency of Social Gatherings with Friends and Family:     Attends Spiritism Services:     Active Member of Clubs or Organizations:     Attends Club or Organization Meetings:     Marital Status:    Intimate Partner Violence:     Fear of Current or Ex-Partner:     Emotionally Abused:     Physically Abused:     Sexually Abused:      No current facility-administered medications for this encounter. Current Outpatient Medications   Medication Sig Dispense Refill    clindamycin (CLEOCIN) 300 MG capsule Take 1 capsule by mouth 3 times daily for 10 days 30 capsule 0    HYDROcodone-acetaminophen (NORCO) 5-325 MG per tablet Take 1 tablet by mouth every 6 hours as needed for Pain for up to 3 days. 10 tablet 0    allopurinol (ZYLOPRIM) 100 MG tablet Take 100 mg by mouth daily      lactulose (CHRONULAC) 10 GM/15ML solution Take 15 mLs by mouth daily      potassium chloride (MICRO-K) 10 MEQ extended release capsule Take 10 mEq by mouth daily      rifaximin (XIFAXAN) 550 MG tablet Take 1 tablet by mouth 2 times daily      pregabalin (LYRICA) 75 MG capsule Take 1 capsule by mouth 3 times daily for 30 days.  90 capsule 0    atorvastatin (LIPITOR) 20 MG tablet Take 1 tablet by mouth nightly 30 tablet 3    metoprolol tartrate (LOPRESSOR) 25 MG tablet Take 1 tablet by mouth 2 times daily 60 tablet 3    FLUoxetine (PROZAC) 20 MG capsule Take 1 capsule by mouth daily 30 capsule 0    furosemide (LASIX) 40 MG tablet Take 1 tablet by mouth 2 times daily 60 tablet 0    spironolactone (ALDACTONE) 25 MG tablet Take 1 tablet by mouth 2 times daily (Patient taking differently: Take 100 mg by mouth 2 times daily ) 60 tablet 0    warfarin (COUMADIN) 2.5 MG tablet Take one tablet in the evening every day  Please check INR tomorrow at 1/16/2018 with  office and dose of coumadin may need to be adjusted (Patient taking differently: 2.5 mg daily except on Thursdays she takes 5 mg) 30 tablet 0    sotalol (BETAPACE) 80 MG tablet Take 0.5 tablets by mouth 2 times daily 60 tablet 3    levETIRAcetam (KEPPRA) 750 MG tablet TK  1 T PO BID  2    metFORMIN (GLUCOPHAGE) 1000 MG tablet 750 mg  4    glimepiride (AMARYL) 2 MG tablet Take 1 mg by mouth every morning (before breakfast)       aspirin 81 MG EC tablet Take 1 tablet by mouth daily 30 tablet 3    ipratropium-albuterol (DUONEB) 0.5-2.5 (3) MG/3ML SOLN nebulizer solution Take 1 vial by nebulization every 4 hours as needed       budesonide-formoterol (SYMBICORT) 160-4.5 MCG/ACT AERO Inhale 2 puffs into the lungs 2 times daily 1 Inhaler 5    linagliptin (TRADJENTA) 5 MG tablet Take 5 mg by mouth daily      LORazepam (ATIVAN) 1 MG tablet Take 1 mg by mouth every 6 hours as needed for Anxiety Up to 4 a day.  levothyroxine (SYNTHROID) 137 MCG tablet Take 125 mcg by mouth Daily        Allergies   Allergen Reactions    Ceclor [Cefaclor] Hives    Tape [Adhesive Tape] Rash     Paper tape ok     Nursing Notes Reviewed    Physical Exam:  ED Triage Vitals [10/20/21 1011]   Enc Vitals Group      BP (!) 146/65      Pulse 79      Resp 18      Temp 96.3 °F (35.7 °C)      Temp Source Oral      SpO2 96 %      Weight 175 lb (79.4 kg)      Height 5' 6\" (1.676 m)      Head Circumference       Peak Flow       Pain Score       Pain Loc       Pain Edu? Excl. in [address]? GENERAL APPEARANCE: Awake and alert. Cooperative. No acute distress. Nontoxic in appearance  HEAD: Normocephalic. Atraumatic. EYES: Sclera anicteric. ENT: Tolerates saliva. NECK: Supple. Trachea midline. LUNGS: Respirations unlabored. EXTREMITIES: No acute deformities. Focused examination of her right lower extremity reveals some swelling around her right ankle. There is some tenderness palpation of the dorsum of her right foot. She has good pulses present in her right foot there is no pain to palpation of her posterior calf no cords are palpable. She does have a well-healing sore present on her proximal anterior shin on the lateral right side. There is some redness extending from this area into the ankle. SKIN: Warm and dry. NEUROLOGICAL: No gross facial drooping. PSYCHIATRIC: Normal mood.     Labs:  Results for orders placed or performed during the hospital encounter of 10/20/21   PT - INR   Result Value Ref Range    Protime 45.4 (H) 11.7 - 14.5 SECONDS    INR 3.59 INDEX   CBC Auto Differential   Result Value Ref Range    WBC 6.8 4.0 - 10.5 K/CU MM    RBC 4.36 4.2 - 5.4 M/CU MM    Hemoglobin 14.0 12.5 - 16.0 GM/DL    Hematocrit 44.0 37 - 47 %    .9 (H) 78 - 100 FL    MCH 32.1 (H) 27 - 31 PG    MCHC 31.8 (L) 32.0 - 36.0 %    RDW 13.7 11.7 - 14.9 %    Platelets 822 227 - 734 K/CU MM    MPV 10.4 7.5 - 11.1 FL    Differential Type AUTOMATED DIFFERENTIAL     Segs Relative 73.3 (H) 36 - 66 %    Lymphocytes % 13.7 (L) 24 - 44 %    Monocytes % 8.7 (H) 0 - 4 %    Eosinophils % 2.4 0 - 3 %    Basophils % 0.7 0 - 1 %    Segs Absolute 5.0 K/CU MM    Lymphocytes Absolute 0.9 K/CU MM    Monocytes Absolute 0.6 K/CU MM    Eosinophils Absolute 0.2 K/CU MM    Basophils Absolute 0.1 K/CU MM    Immature Neutrophil % 1.2 (H) 0 - 0.43 %    Total Immature Neutrophil 0.08 K/CU MM       Radiographs (if obtained):  [] The following radiograph was interpreted by myself in the absence of a radiologist:  [x] Radiologist's Report reviewed at time of ED visit:  VL DUP LOWER EXTREMITY VENOUS RIGHT   Final Result   No evidence of DVT in the right lower extremity. XR ANKLE RIGHT (MIN 3 VIEWS)   Preliminary Result   No acute bony abnormality identified in the right ankle. Soft tissue   swelling about the ankle. ED Course and MDM:  This patient presents to the emergency department with pain and swelling to her right ankle. She has an area on her shin that she was bumped by a screen door that has been healing. I think that her swelling may be related to this area. There is no evidence of DVT and no evidence of bony abnormality. Her INR is elevated and I will instruct her to take 2.5 mg of Coumadin for the main of this week instead of her usual 5 mg dosing. She is to call her primary caregiver to find out if she wants to vary the dosage further. She is scheduled to have another INR on Monday and she is encouraged to keep that appointment without fail. She is encouraged to follow-up with her primary caregiver on Monday for recheck of her leg as well. She is discharged stable condition at this time. Final Impression:  1. Pain and swelling of right lower extremity    2. Anticoagulated on Coumadin      DISPOSITION Decision To Discharge    Patient referred to:  Narendra Leonard MD  [address]  [address] [facility]  [phone]    Go in 1 week  For follow up    Discharge medications:  New Prescriptions    CLINDAMYCIN (CLEOCIN) 300 MG CAPSULE    Take 1 capsule by mouth 3 times daily for 10 days    HYDROCODONE-ACETAMINOPHEN (NORCO) 5-325 MG PER TABLET    Take 1 tablet by mouth every 6 hours as needed for Pain for up to 3 days.      (Please note that portions of this note may have been completed with a voice recognition program. Efforts were made to edit the dictations but occasionally words are mis-transcribed.)    Mariposa Costello DO, [facility]  Board certified in [address]  10/20/21 1227

## 2021-12-20 ENCOUNTER — HOSPITAL ENCOUNTER (OUTPATIENT)
Dept: MAMMOGRAPHY | Age: 76
Discharge: HOME | End: 2021-12-20
Payer: MEDICARE

## 2021-12-20 DIAGNOSIS — Z78.0 POST-MENOPAUSAL: ICD-10-CM

## 2021-12-20 PROCEDURE — 77080 DXA BONE DENSITY AXIAL: CPT

## 2022-03-23 ENCOUNTER — HOSPITAL ENCOUNTER (OUTPATIENT)
Dept: CT IMAGING | Age: 77
Discharge: HOME | End: 2022-03-23
Payer: MEDICARE

## 2022-03-23 DIAGNOSIS — I71.21 ASCENDING AORTIC ANEURYSM: ICD-10-CM

## 2022-03-23 LAB
GFR AFRICAN AMERICAN: 47 ML/MIN/1.73M2
GFR NON-AFRICAN AMERICAN: 39 ML/MIN/1.73M2
POC CREATININE: 1.3 MG/DL (ref 0.6–1.1)

## 2022-03-23 PROCEDURE — 6360000004 HC RX CONTRAST MEDICATION: Performed by: THORACIC SURGERY (CARDIOTHORACIC VASCULAR SURGERY)

## 2022-03-23 PROCEDURE — 74174 CTA ABD&PLVS W/CONTRAST: CPT

## 2022-03-23 PROCEDURE — 71275 CT ANGIOGRAPHY CHEST: CPT

## 2022-03-23 RX ORDER — SODIUM CHLORIDE 0.9 % (FLUSH) 0.9 %
5-40 SYRINGE (ML) INJECTION PRN
Status: DISCONTINUED | OUTPATIENT
Start: 2022-03-23 | End: 2022-03-24 | Stop reason: HOSPADM

## 2022-03-23 RX ADMIN — IOPAMIDOL 70 ML: 755 INJECTION, SOLUTION INTRAVENOUS at 10:10

## 2022-06-25 ENCOUNTER — HOSPITAL ENCOUNTER (INPATIENT)
Age: 77
LOS: 2 days | Discharge: HOME | DRG: 291 | End: 2022-06-27
Attending: EMERGENCY MEDICINE | Admitting: INTERNAL MEDICINE
Payer: MEDICARE

## 2022-06-25 ENCOUNTER — APPOINTMENT (OUTPATIENT)
Dept: GENERAL RADIOLOGY | Age: 77
DRG: 291 | End: 2022-06-25
Payer: MEDICARE

## 2022-06-25 DIAGNOSIS — I50.9 ACUTE CONGESTIVE HEART FAILURE, UNSPECIFIED HEART FAILURE TYPE (HCC): Primary | ICD-10-CM

## 2022-06-25 DIAGNOSIS — R09.02 HYPOXIA: ICD-10-CM

## 2022-06-25 DIAGNOSIS — J18.9 PNEUMONIA OF RIGHT LOWER LOBE DUE TO INFECTIOUS ORGANISM: ICD-10-CM

## 2022-06-25 PROBLEM — I50.33 ACUTE ON CHRONIC DIASTOLIC CHF (CONGESTIVE HEART FAILURE) (HCC): Status: ACTIVE | Noted: 2022-06-25

## 2022-06-25 LAB
ADENOVIRUS DETECTION BY PCR: NOT DETECTED
ALBUMIN SERPL-MCNC: 3.5 GM/DL (ref 3.4–5)
ALP BLD-CCNC: 78 IU/L (ref 40–129)
ALT SERPL-CCNC: 20 U/L (ref 10–40)
ANION GAP SERPL CALCULATED.3IONS-SCNC: 10 MMOL/L (ref 4–16)
AST SERPL-CCNC: 24 IU/L (ref 15–37)
BACTERIA: ABNORMAL /HPF
BASOPHILS ABSOLUTE: 0.1 K/CU MM
BASOPHILS RELATIVE PERCENT: 0.8 % (ref 0–1)
BILIRUB SERPL-MCNC: 1.1 MG/DL (ref 0–1)
BILIRUBIN URINE: NEGATIVE MG/DL
BLOOD, URINE: NEGATIVE
BORDETELLA PARAPERTUSSIS BY PCR: NOT DETECTED
BORDETELLA PERTUSSIS PCR: NOT DETECTED
BUN BLDV-MCNC: 12 MG/DL (ref 6–23)
CALCIUM SERPL-MCNC: 9.1 MG/DL (ref 8.3–10.6)
CAST TYPE: ABNORMAL /HPF
CHLAMYDOPHILA PNEUMONIA PCR: NOT DETECTED
CHLORIDE BLD-SCNC: 100 MMOL/L (ref 99–110)
CLARITY: CLEAR
CO2: 28 MMOL/L (ref 21–32)
COLOR: YELLOW
CORONAVIRUS 229E PCR: NOT DETECTED
CORONAVIRUS HKU1 PCR: NOT DETECTED
CORONAVIRUS NL63 PCR: NOT DETECTED
CORONAVIRUS OC43 PCR: NOT DETECTED
CREAT SERPL-MCNC: 1 MG/DL (ref 0.6–1.1)
CRYSTAL TYPE: ABNORMAL /HPF
DIFFERENTIAL TYPE: ABNORMAL
EKG ATRIAL RATE: 66 BPM
EKG DIAGNOSIS: NORMAL
EKG P AXIS: 45 DEGREES
EKG P-R INTERVAL: 152 MS
EKG Q-T INTERVAL: 412 MS
EKG QRS DURATION: 80 MS
EKG QTC CALCULATION (BAZETT): 431 MS
EKG R AXIS: 40 DEGREES
EKG T AXIS: -17 DEGREES
EKG VENTRICULAR RATE: 66 BPM
EOSINOPHILS ABSOLUTE: 0.1 K/CU MM
EOSINOPHILS RELATIVE PERCENT: 0.8 % (ref 0–3)
GFR AFRICAN AMERICAN: >60 ML/MIN/1.73M2
GFR NON-AFRICAN AMERICAN: 54 ML/MIN/1.73M2
GLUCOSE BLD-MCNC: 127 MG/DL (ref 70–99)
GLUCOSE BLD-MCNC: 144 MG/DL (ref 70–99)
GLUCOSE BLD-MCNC: 95 MG/DL (ref 70–99)
GLUCOSE, URINE: 100 MG/DL
HCT VFR BLD CALC: 44 % (ref 37–47)
HEMOGLOBIN: 14.4 GM/DL (ref 12.5–16)
HUMAN METAPNEUMOVIRUS PCR: NOT DETECTED
IMMATURE NEUTROPHIL %: 2.7 % (ref 0–0.43)
INFLUENZA A BY PCR: NOT DETECTED
INFLUENZA A H1 (2009) PCR: NOT DETECTED
INFLUENZA A H1 PANDEMIC PCR: NOT DETECTED
INFLUENZA A H3 PCR: NOT DETECTED
INFLUENZA B BY PCR: NOT DETECTED
INR BLD: 2.45 INDEX
KETONES, URINE: NEGATIVE MG/DL
LEGIONELLA URINARY AG: NEGATIVE
LEUKOCYTE ESTERASE, URINE: NEGATIVE
LYMPHOCYTES ABSOLUTE: 1.2 K/CU MM
LYMPHOCYTES RELATIVE PERCENT: 12.4 % (ref 24–44)
MCH RBC QN AUTO: 32.6 PG (ref 27–31)
MCHC RBC AUTO-ENTMCNC: 32.7 % (ref 32–36)
MCV RBC AUTO: 99.5 FL (ref 78–100)
MONOCYTES ABSOLUTE: 0.8 K/CU MM
MONOCYTES RELATIVE PERCENT: 8.2 % (ref 0–4)
MYCOPLASMA PNEUMONIAE PCR: NOT DETECTED
NITRITE URINE, QUANTITATIVE: NEGATIVE
PARAINFLUENZA 1 PCR: NOT DETECTED
PARAINFLUENZA 2 PCR: NOT DETECTED
PARAINFLUENZA 3 PCR: NOT DETECTED
PARAINFLUENZA 4 PCR: NOT DETECTED
PDW BLD-RTO: 13.8 % (ref 11.7–14.9)
PH, URINE: 6.5 (ref 5–8)
PLATELET # BLD: 154 K/CU MM (ref 140–440)
PMV BLD AUTO: 10.5 FL (ref 7.5–11.1)
POTASSIUM SERPL-SCNC: 4 MMOL/L (ref 3.5–5.1)
PRO-BNP: 5686 PG/ML
PROTEIN UA: NEGATIVE MG/DL
PROTHROMBIN TIME: 30.7 SECONDS (ref 11.7–14.5)
RBC # BLD: 4.42 M/CU MM (ref 4.2–5.4)
RBC URINE: 2 /HPF (ref 0–6)
RHINOVIRUS ENTEROVIRUS PCR: NOT DETECTED
RSV PCR: NOT DETECTED
SARS-COV-2: NOT DETECTED
SEGMENTED NEUTROPHILS ABSOLUTE COUNT: 6.9 K/CU MM
SEGMENTED NEUTROPHILS RELATIVE PERCENT: 75.1 % (ref 36–66)
SODIUM BLD-SCNC: 138 MMOL/L (ref 135–145)
SPECIFIC GRAVITY UA: 1.01 (ref 1–1.03)
STREP PNEUMONIAE ANTIGEN: NORMAL
T4 FREE: 1.35 NG/DL (ref 0.9–1.8)
TOTAL IMMATURE NEUTOROPHIL: 0.25 K/CU MM
TOTAL PROTEIN: 5.9 GM/DL (ref 6.4–8.2)
TROPONIN T: <0.01 NG/ML
TSH HIGH SENSITIVITY: 1.1 UIU/ML (ref 0.27–4.2)
UROBILINOGEN, URINE: 0.2 MG/DL (ref 0.2–1)
WBC # BLD: 9.2 K/CU MM (ref 4–10.5)
WBC UA: 7 /HPF (ref 0–5)

## 2022-06-25 PROCEDURE — 87070 CULTURE OTHR SPECIMN AEROBIC: CPT

## 2022-06-25 PROCEDURE — 87086 URINE CULTURE/COLONY COUNT: CPT

## 2022-06-25 PROCEDURE — 87899 AGENT NOS ASSAY W/OPTIC: CPT

## 2022-06-25 PROCEDURE — 96375 TX/PRO/DX INJ NEW DRUG ADDON: CPT

## 2022-06-25 PROCEDURE — 93010 ELECTROCARDIOGRAM REPORT: CPT | Performed by: INTERNAL MEDICINE

## 2022-06-25 PROCEDURE — 6370000000 HC RX 637 (ALT 250 FOR IP): Performed by: EMERGENCY MEDICINE

## 2022-06-25 PROCEDURE — 84443 ASSAY THYROID STIM HORMONE: CPT

## 2022-06-25 PROCEDURE — 83880 ASSAY OF NATRIURETIC PEPTIDE: CPT

## 2022-06-25 PROCEDURE — 6370000000 HC RX 637 (ALT 250 FOR IP): Performed by: NURSE PRACTITIONER

## 2022-06-25 PROCEDURE — 94640 AIRWAY INHALATION TREATMENT: CPT

## 2022-06-25 PROCEDURE — 84484 ASSAY OF TROPONIN QUANT: CPT

## 2022-06-25 PROCEDURE — 83036 HEMOGLOBIN GLYCOSYLATED A1C: CPT

## 2022-06-25 PROCEDURE — 6360000002 HC RX W HCPCS: Performed by: NURSE PRACTITIONER

## 2022-06-25 PROCEDURE — 1200000000 HC SEMI PRIVATE

## 2022-06-25 PROCEDURE — 96374 THER/PROPH/DIAG INJ IV PUSH: CPT

## 2022-06-25 PROCEDURE — 2500000003 HC RX 250 WO HCPCS: Performed by: NURSE PRACTITIONER

## 2022-06-25 PROCEDURE — 6360000002 HC RX W HCPCS: Performed by: EMERGENCY MEDICINE

## 2022-06-25 PROCEDURE — 99285 EMERGENCY DEPT VISIT HI MDM: CPT

## 2022-06-25 PROCEDURE — 85610 PROTHROMBIN TIME: CPT

## 2022-06-25 PROCEDURE — 85025 COMPLETE CBC W/AUTO DIFF WBC: CPT

## 2022-06-25 PROCEDURE — 93005 ELECTROCARDIOGRAM TRACING: CPT | Performed by: EMERGENCY MEDICINE

## 2022-06-25 PROCEDURE — 87040 BLOOD CULTURE FOR BACTERIA: CPT

## 2022-06-25 PROCEDURE — 71045 X-RAY EXAM CHEST 1 VIEW: CPT

## 2022-06-25 PROCEDURE — 84439 ASSAY OF FREE THYROXINE: CPT

## 2022-06-25 PROCEDURE — 2580000003 HC RX 258: Performed by: NURSE PRACTITIONER

## 2022-06-25 PROCEDURE — 82962 GLUCOSE BLOOD TEST: CPT

## 2022-06-25 PROCEDURE — 81001 URINALYSIS AUTO W/SCOPE: CPT

## 2022-06-25 PROCEDURE — 0202U NFCT DS 22 TRGT SARS-COV-2: CPT

## 2022-06-25 PROCEDURE — 87449 NOS EACH ORGANISM AG IA: CPT

## 2022-06-25 PROCEDURE — 80053 COMPREHEN METABOLIC PANEL: CPT

## 2022-06-25 RX ORDER — LEVOFLOXACIN 5 MG/ML
750 INJECTION, SOLUTION INTRAVENOUS EVERY 24 HOURS
Status: DISCONTINUED | OUTPATIENT
Start: 2022-06-26 | End: 2022-06-25

## 2022-06-25 RX ORDER — INSULIN LISPRO 100 [IU]/ML
0-12 INJECTION, SOLUTION INTRAVENOUS; SUBCUTANEOUS
Status: DISCONTINUED | OUTPATIENT
Start: 2022-06-25 | End: 2022-06-27 | Stop reason: HOSPADM

## 2022-06-25 RX ORDER — DEXTROSE MONOHYDRATE 50 MG/ML
100 INJECTION, SOLUTION INTRAVENOUS PRN
Status: DISCONTINUED | OUTPATIENT
Start: 2022-06-25 | End: 2022-06-27 | Stop reason: HOSPADM

## 2022-06-25 RX ORDER — LORAZEPAM 2 MG/ML
1 INJECTION INTRAMUSCULAR ONCE
Status: COMPLETED | OUTPATIENT
Start: 2022-06-25 | End: 2022-06-25

## 2022-06-25 RX ORDER — FUROSEMIDE 10 MG/ML
40 INJECTION INTRAMUSCULAR; INTRAVENOUS 2 TIMES DAILY
Status: DISCONTINUED | OUTPATIENT
Start: 2022-06-25 | End: 2022-06-27

## 2022-06-25 RX ORDER — POLYETHYLENE GLYCOL 3350 17 G/17G
17 POWDER, FOR SOLUTION ORAL DAILY PRN
Status: DISCONTINUED | OUTPATIENT
Start: 2022-06-25 | End: 2022-06-27 | Stop reason: HOSPADM

## 2022-06-25 RX ORDER — IPRATROPIUM BROMIDE AND ALBUTEROL SULFATE 2.5; .5 MG/3ML; MG/3ML
1 SOLUTION RESPIRATORY (INHALATION)
Status: DISCONTINUED | OUTPATIENT
Start: 2022-06-25 | End: 2022-06-27 | Stop reason: HOSPADM

## 2022-06-25 RX ORDER — FLUOXETINE HYDROCHLORIDE 20 MG/1
20 CAPSULE ORAL DAILY
Status: DISCONTINUED | OUTPATIENT
Start: 2022-06-25 | End: 2022-06-27 | Stop reason: HOSPADM

## 2022-06-25 RX ORDER — ALBUTEROL SULFATE 90 UG/1
AEROSOL, METERED RESPIRATORY (INHALATION)
COMMUNITY
Start: 2022-05-11

## 2022-06-25 RX ORDER — INSULIN LISPRO 100 [IU]/ML
0-6 INJECTION, SOLUTION INTRAVENOUS; SUBCUTANEOUS NIGHTLY
Status: DISCONTINUED | OUTPATIENT
Start: 2022-06-25 | End: 2022-06-27 | Stop reason: HOSPADM

## 2022-06-25 RX ORDER — ONDANSETRON 2 MG/ML
4 INJECTION INTRAMUSCULAR; INTRAVENOUS EVERY 6 HOURS PRN
Status: DISCONTINUED | OUTPATIENT
Start: 2022-06-25 | End: 2022-06-25

## 2022-06-25 RX ORDER — SODIUM CHLORIDE 0.9 % (FLUSH) 0.9 %
5-40 SYRINGE (ML) INJECTION EVERY 12 HOURS SCHEDULED
Status: DISCONTINUED | OUTPATIENT
Start: 2022-06-25 | End: 2022-06-27 | Stop reason: HOSPADM

## 2022-06-25 RX ORDER — BENZONATATE 100 MG/1
100 CAPSULE ORAL 3 TIMES DAILY PRN
Status: ON HOLD | COMMUNITY
Start: 2022-06-21 | End: 2022-06-27 | Stop reason: HOSPADM

## 2022-06-25 RX ORDER — ONDANSETRON 4 MG/1
4 TABLET, ORALLY DISINTEGRATING ORAL EVERY 8 HOURS PRN
Status: DISCONTINUED | OUTPATIENT
Start: 2022-06-25 | End: 2022-06-25

## 2022-06-25 RX ORDER — METHYLPREDNISOLONE 4 MG/1
TABLET ORAL
Status: ON HOLD | COMMUNITY
Start: 2022-06-21 | End: 2022-06-27 | Stop reason: HOSPADM

## 2022-06-25 RX ORDER — BUDESONIDE AND FORMOTEROL FUMARATE DIHYDRATE 80; 4.5 UG/1; UG/1
2 AEROSOL RESPIRATORY (INHALATION) 2 TIMES DAILY
Refills: 5 | Status: DISCONTINUED | OUTPATIENT
Start: 2022-06-25 | End: 2022-06-27 | Stop reason: HOSPADM

## 2022-06-25 RX ORDER — LEVOFLOXACIN 5 MG/ML
750 INJECTION, SOLUTION INTRAVENOUS EVERY 24 HOURS
Status: DISCONTINUED | OUTPATIENT
Start: 2022-06-25 | End: 2022-06-25

## 2022-06-25 RX ORDER — WARFARIN SODIUM 5 MG/1
5 TABLET ORAL
Status: DISCONTINUED | OUTPATIENT
Start: 2022-06-28 | End: 2022-06-27 | Stop reason: DRUGHIGH

## 2022-06-25 RX ORDER — ACETAMINOPHEN 325 MG/1
650 TABLET ORAL EVERY 6 HOURS PRN
Status: DISCONTINUED | OUTPATIENT
Start: 2022-06-25 | End: 2022-06-27 | Stop reason: HOSPADM

## 2022-06-25 RX ORDER — LEVOTHYROXINE SODIUM 0.12 MG/1
125 TABLET ORAL DAILY
Status: DISCONTINUED | OUTPATIENT
Start: 2022-06-25 | End: 2022-06-27 | Stop reason: HOSPADM

## 2022-06-25 RX ORDER — ALLOPURINOL 100 MG/1
100 TABLET ORAL DAILY
Status: DISCONTINUED | OUTPATIENT
Start: 2022-06-25 | End: 2022-06-27 | Stop reason: HOSPADM

## 2022-06-25 RX ORDER — SODIUM CHLORIDE 0.9 % (FLUSH) 0.9 %
5-40 SYRINGE (ML) INJECTION PRN
Status: DISCONTINUED | OUTPATIENT
Start: 2022-06-25 | End: 2022-06-27 | Stop reason: HOSPADM

## 2022-06-25 RX ORDER — SPIRONOLACTONE 50 MG/1
100 TABLET, FILM COATED ORAL 2 TIMES DAILY
Status: DISCONTINUED | OUTPATIENT
Start: 2022-06-25 | End: 2022-06-27 | Stop reason: HOSPADM

## 2022-06-25 RX ORDER — ACETAMINOPHEN 650 MG/1
650 SUPPOSITORY RECTAL EVERY 6 HOURS PRN
Status: DISCONTINUED | OUTPATIENT
Start: 2022-06-25 | End: 2022-06-27 | Stop reason: HOSPADM

## 2022-06-25 RX ORDER — LORAZEPAM 1 MG/1
1 TABLET ORAL EVERY 6 HOURS PRN
Status: DISCONTINUED | OUTPATIENT
Start: 2022-06-25 | End: 2022-06-27 | Stop reason: HOSPADM

## 2022-06-25 RX ORDER — SODIUM CHLORIDE 9 MG/ML
250 INJECTION, SOLUTION INTRAVENOUS PRN
Status: DISCONTINUED | OUTPATIENT
Start: 2022-06-25 | End: 2022-06-27 | Stop reason: HOSPADM

## 2022-06-25 RX ORDER — SOTALOL HYDROCHLORIDE 80 MG/1
40 TABLET ORAL 2 TIMES DAILY
Status: DISCONTINUED | OUTPATIENT
Start: 2022-06-25 | End: 2022-06-27 | Stop reason: HOSPADM

## 2022-06-25 RX ORDER — ATORVASTATIN CALCIUM 20 MG/1
20 TABLET, FILM COATED ORAL NIGHTLY
Status: DISCONTINUED | OUTPATIENT
Start: 2022-06-25 | End: 2022-06-27 | Stop reason: HOSPADM

## 2022-06-25 RX ORDER — WARFARIN SODIUM 2.5 MG/1
2.5 TABLET ORAL
Status: DISCONTINUED | OUTPATIENT
Start: 2022-06-25 | End: 2022-06-27 | Stop reason: DRUGHIGH

## 2022-06-25 RX ORDER — LEVOFLOXACIN 500 MG/1
500 TABLET, FILM COATED ORAL DAILY
Status: DISCONTINUED | OUTPATIENT
Start: 2022-06-25 | End: 2022-06-25

## 2022-06-25 RX ORDER — ALBUTEROL SULFATE 90 UG/1
1 AEROSOL, METERED RESPIRATORY (INHALATION) EVERY 4 HOURS PRN
Status: DISCONTINUED | OUTPATIENT
Start: 2022-06-25 | End: 2022-06-27 | Stop reason: HOSPADM

## 2022-06-25 RX ORDER — BENZONATATE 100 MG/1
100 CAPSULE ORAL 3 TIMES DAILY PRN
Status: DISCONTINUED | OUTPATIENT
Start: 2022-06-25 | End: 2022-06-27 | Stop reason: HOSPADM

## 2022-06-25 RX ORDER — FUROSEMIDE 10 MG/ML
40 INJECTION INTRAMUSCULAR; INTRAVENOUS ONCE
Status: COMPLETED | OUTPATIENT
Start: 2022-06-25 | End: 2022-06-25

## 2022-06-25 RX ORDER — LACTULOSE 10 G/15ML
15 SOLUTION ORAL DAILY
Status: DISCONTINUED | OUTPATIENT
Start: 2022-06-25 | End: 2022-06-27 | Stop reason: HOSPADM

## 2022-06-25 RX ADMIN — DOXYCYCLINE 100 MG: 100 INJECTION, POWDER, LYOPHILIZED, FOR SOLUTION INTRAVENOUS at 15:02

## 2022-06-25 RX ADMIN — SPIRONOLACTONE 100 MG: 50 TABLET ORAL at 17:31

## 2022-06-25 RX ADMIN — SOTALOL HYDROCHLORIDE 40 MG: 80 TABLET ORAL at 12:07

## 2022-06-25 RX ADMIN — FLUOXETINE 20 MG: 20 CAPSULE ORAL at 12:07

## 2022-06-25 RX ADMIN — LEVOFLOXACIN 500 MG: 500 TABLET, FILM COATED ORAL at 08:48

## 2022-06-25 RX ADMIN — IPRATROPIUM BROMIDE AND ALBUTEROL SULFATE 1 AMPULE: 2.5; .5 SOLUTION RESPIRATORY (INHALATION) at 14:25

## 2022-06-25 RX ADMIN — RIFAXIMIN 550 MG: 550 TABLET ORAL at 12:07

## 2022-06-25 RX ADMIN — LEVOTHYROXINE SODIUM 125 MCG: 0.12 TABLET ORAL at 12:07

## 2022-06-25 RX ADMIN — ATORVASTATIN CALCIUM 20 MG: 20 TABLET, FILM COATED ORAL at 20:39

## 2022-06-25 RX ADMIN — SODIUM CHLORIDE 250 ML: 9 INJECTION, SOLUTION INTRAVENOUS at 15:01

## 2022-06-25 RX ADMIN — FUROSEMIDE 40 MG: 10 INJECTION, SOLUTION INTRAMUSCULAR; INTRAVENOUS at 09:01

## 2022-06-25 RX ADMIN — WARFARIN SODIUM 2.5 MG: 2.5 TABLET ORAL at 17:31

## 2022-06-25 RX ADMIN — LORAZEPAM 1 MG: 1 TABLET ORAL at 20:40

## 2022-06-25 RX ADMIN — IPRATROPIUM BROMIDE AND ALBUTEROL SULFATE 1 AMPULE: 2.5; .5 SOLUTION RESPIRATORY (INHALATION) at 19:09

## 2022-06-25 RX ADMIN — LACTULOSE 10 G: 10 SOLUTION ORAL at 12:08

## 2022-06-25 RX ADMIN — SPIRONOLACTONE 100 MG: 50 TABLET ORAL at 12:07

## 2022-06-25 RX ADMIN — BUDESONIDE AND FORMOTEROL FUMARATE DIHYDRATE 2 PUFF: 80; 4.5 AEROSOL RESPIRATORY (INHALATION) at 14:23

## 2022-06-25 RX ADMIN — LORAZEPAM 1 MG: 2 INJECTION INTRAMUSCULAR; INTRAVENOUS at 08:27

## 2022-06-25 RX ADMIN — LEVETIRACETAM 750 MG: 500 TABLET, FILM COATED ORAL at 12:07

## 2022-06-25 RX ADMIN — ALLOPURINOL 100 MG: 100 TABLET ORAL at 12:07

## 2022-06-25 RX ADMIN — LEVETIRACETAM 750 MG: 500 TABLET, FILM COATED ORAL at 20:39

## 2022-06-25 RX ADMIN — RIFAXIMIN 550 MG: 550 TABLET ORAL at 20:40

## 2022-06-25 RX ADMIN — BUDESONIDE AND FORMOTEROL FUMARATE DIHYDRATE 2 PUFF: 80; 4.5 AEROSOL RESPIRATORY (INHALATION) at 19:11

## 2022-06-25 RX ADMIN — SOTALOL HYDROCHLORIDE 40 MG: 80 TABLET ORAL at 20:39

## 2022-06-25 RX ADMIN — FUROSEMIDE 40 MG: 10 INJECTION, SOLUTION INTRAMUSCULAR; INTRAVENOUS at 16:13

## 2022-06-25 RX ADMIN — SODIUM CHLORIDE, PRESERVATIVE FREE 10 ML: 5 INJECTION INTRAVENOUS at 20:38

## 2022-06-25 ASSESSMENT — PAIN DESCRIPTION - ORIENTATION: ORIENTATION: RIGHT;POSTERIOR

## 2022-06-25 ASSESSMENT — PAIN SCALES - GENERAL
PAINLEVEL_OUTOF10: 0
PAINLEVEL_OUTOF10: 7
PAINLEVEL_OUTOF10: 0

## 2022-06-25 ASSESSMENT — PAIN DESCRIPTION - FREQUENCY: FREQUENCY: CONTINUOUS

## 2022-06-25 ASSESSMENT — PAIN DESCRIPTION - PAIN TYPE: TYPE: ACUTE PAIN

## 2022-06-25 ASSESSMENT — LIFESTYLE VARIABLES: HOW OFTEN DO YOU HAVE A DRINK CONTAINING ALCOHOL: NEVER

## 2022-06-25 ASSESSMENT — PAIN - FUNCTIONAL ASSESSMENT
PAIN_FUNCTIONAL_ASSESSMENT: 0-10
PAIN_FUNCTIONAL_ASSESSMENT: PREVENTS OR INTERFERES WITH MANY ACTIVE NOT PASSIVE ACTIVITIES

## 2022-06-25 ASSESSMENT — PAIN DESCRIPTION - DESCRIPTORS: DESCRIPTORS: DULL

## 2022-06-25 ASSESSMENT — PAIN DESCRIPTION - LOCATION: LOCATION: SHOULDER

## 2022-06-25 NOTE — PLAN OF CARE
Problem: Discharge Planning  Goal: Discharge to home or other facility with appropriate resources  Outcome: Progressing  Flowsheets (Taken 6/25/2022 1115)  Discharge to home or other facility with appropriate resources: Identify barriers to discharge with patient and caregiver     Problem: Pain  Goal: Verbalizes/displays adequate comfort level or baseline comfort level  Outcome: Progressing     Problem: ABCDS Injury Assessment  Goal: Absence of physical injury  Outcome: Progressing

## 2022-06-25 NOTE — ED TRIAGE NOTES
Arrived per [address] [facility] to room 4 for triage. Tolerated without difficulty. Bed in lowest position. Call light given. Gowned for exam. Monitor applied.

## 2022-06-25 NOTE — H&P
V2.0  History and Physical      Name:  Jaspal Quintero /Age/Sex: 1945  (69 y.o. female)   MRN & CSN:  [id number] & [id number] Encounter Date/Time: 2022 9:53 AM EDT   Location:   PCP: Monique Berger MD       Hospital Day: 1    Assessment and Plan:   Jaspal Quintero is a 69 y.o. female who presents with Acute on chronic diastolic CHF (congestive heart failure) [facility]    Hospital Problems           Last Modified POA    * (Principal) Acute on chronic diastolic CHF (congestive heart failure) ([facility] [address] 2022 Yes          1. Acute on chronic diastolic CHF, place patient on IV Lasix 40 mg twice daily reevaluate in a.m.  Echocardiogram has been ordered, last echocardiogram was in 2018, did show an ejection fraction of 50% moderate left ventricular hypertrophy and grade 2 diastolic dysfunction at that time. 2. Pneumonia, patient will be started on doxycycline given she is on sotalol, blood cultures have been completed, urine Legionella and urine strep ordered, sputum culture ordered. 3. History atrial fibrillation, patient does have pacemaker in place at this current time she is in normal sinus rhythm continue current home medications  4. Anxiety, continue current dose of Ativan. 5. History COPD, not in acute exacerbation, continue current inhalers  6. Diabetes mellitus type 2, hold oral medications at this time start insulin a CHS sliding scale. 7.   Hypertension, continue current home medications  8. Hyperlipidemia, continue statin  9. Liver cirrhosis nonalcoholic, continue lactulose, rifaximin, and Lasix  10. Hypothyroidism, continue Synthroid  11. Seizures, continue Keppra  12. Hepatic encephalopathy, continue lactulose and rifaximin      Disposition:   Current Living situation: Home  Expected Disposition: Home  Estimated D/C: 24 to 48 hours    Diet ADULT DIET; Regular; 4 carb choices (60 gm/meal);  Low Sodium (2 gm)   DVT Prophylaxis Coumadin [] Lovenox, []  Heparin, [] SCDs, [] Ambulation,  [] Candace, [] Xarelto   Code Status Full Code   Surrogate Decision Maker/ POA  John nance child emergency contact, no ACP documents     History from:     patient    History of Present Illness:     Chief Complaint: Acute on chronic diastolic CHF (congestive heart failure) ([address]  Kris Gong is a 69 y.o. female with pmh of  who presents with      Review of Systems: Need 10 Elements     10 point review of systems completed and is negative unless stated above. Objective:   No intake or output data in the 24 hours ending 06/25/22 0953   Vitals:   Vitals:    06/25/22 0845 06/25/22 0900 06/25/22 0922 06/25/22 0924   BP: (!) 123/113 (!) 167/65 (!) 161/62 (!) 165/80   Pulse: 67 67 68 66   Resp: 20 (!) 34 22 16   Temp:       TempSrc:       SpO2: 95% 94% (!) 89% 98%   Weight:       Height:           Medications Prior to Admission     Prior to Admission medications    Medication Sig Start Date End Date Taking? Authorizing Provider   benzonatate (TESSALON) 100 MG capsule Take 100 mg by mouth 3 times daily as needed 6/21/22  Yes Historical Provider, MD   albuterol sulfate HFA (PROVENTIL;VENTOLIN;PROAIR) 108 (90 Base) MCG/ACT inhaler  5/11/22   Historical Provider, MD   methylPREDNISolone (MEDROL DOSEPACK) 4 MG tablet  6/21/22   Historical Provider, MD   allopurinol (ZYLOPRIM) 100 MG tablet Take 100 mg by mouth daily 7/8/21   Historical Provider, MD   lactulose (CHRONULAC) 10 GM/15ML solution Take 15 mLs by mouth daily 12/28/20   Historical Provider, MD   potassium chloride (MICRO-K) 10 MEQ extended release capsule Take 10 mEq by mouth daily 7/8/21   Historical Provider, MD   rifaximin (XIFAXAN) 550 MG tablet Take 1 tablet by mouth 2 times daily 12/28/20   Historical Provider, MD   pregabalin (LYRICA) 75 MG capsule Take 1 capsule by mouth 3 times daily for 30 days.  7/15/21 8/14/21  Porsche Puri DO   atorvastatin (LIPITOR) 20 MG tablet Take 1 tablet by mouth nightly 6/14/18   Francine Copeland PA-C   metoprolol tartrate (LOPRESSOR) 25 MG tablet Take 1 tablet by mouth 2 times daily 6/14/18   Hilario Flores PA-C   FLUoxetine (PROZAC) 20 MG capsule Take 1 capsule by mouth daily 2/7/18   Jabier Sykes MD   furosemide (LASIX) 40 MG tablet Take 1 tablet by mouth 2 times daily 2/6/18   Jabier Sykes MD   spironolactone (ALDACTONE) 25 MG tablet Take 1 tablet by mouth 2 times daily  Patient taking differently: Take 100 mg by mouth 2 times daily  2/6/18   Jabier Sykes MD   warfarin (COUMADIN) 2.5 MG tablet Take one tablet in the evening every day  Please check INR tomorrow at 1/16/2018 with  office and dose of coumadin may need to be adjusted  Patient taking differently: 2.5 mg daily except on Thursdays she takes 5 mg 1/15/18   Brenden Lauren MD   sotalol (BETAPACE) 80 MG tablet Take 0.5 tablets by mouth 2 times daily 1/15/18   Brenden Lauren MD   levETIRAcetam (KEPPRA) 750 MG tablet TK  1 T PO BID 12/28/17   Historical Provider, MD   metFORMIN (GLUCOPHAGE) 1000 MG tablet 750 mg 10/12/17   Historical Provider, MD   glimepiride (AMARYL) 2 MG tablet Take 1 mg by mouth every morning (before breakfast)     Historical Provider, MD   aspirin 81 MG EC tablet Take 1 tablet by mouth daily 8/1/17   Hilario Flores PA-C   ipratropium-albuterol (DUONEB) 0.5-2.5 (3) MG/3ML SOLN nebulizer solution Take 1 vial by nebulization every 4 hours as needed  6/9/17   Historical Provider, MD   budesonide-formoterol (SYMBICORT) 160-4.5 MCG/ACT AERO Inhale 2 puffs into the lungs 2 times daily 7/10/17   Shlomo Vyas MD   linagliptin (TRADJENTA) 5 MG tablet Take 5 mg by mouth daily    Historical Provider, MD   LORazepam (ATIVAN) 1 MG tablet Take 1 mg by mouth every 6 hours as needed for Anxiety Up to 4 a day.     Historical Provider, MD   levothyroxine (SYNTHROID) 137 MCG tablet Take 125 mcg by mouth Daily     Historical Provider, MD       Physical Exam: Need 8 Elements       General: NAD  Eyes: EOMI  ENT: neck supple  Cardiovascular: Regular rate and rhythm  Respiratory: Clear to auscultation bilaterally but diminished in the bases  Gastrointestinal: Soft, non tender, nondistended bowel sounds present all 4 quadrants  Genitourinary: no suprapubic tenderness, no CVA tenderness  Musculoskeletal: No edema lower extremities, no gross joint deformities  Skin: warm, dry  Neuro: Alert and oriented x4  Psych: Mood appropriate. Past Medical History:   PMHx   Past Medical History:   Diagnosis Date    Anxiety     per old chart hx of panic attacks    Atrial fibrillation (HCC)     Atrial fibrillation with normal ventricular rate ([facility] [address] 11/2015    CAD (coronary artery disease)     follows with Dr Guanakito Wiseman, bilateral     COPD (chronic obstructive pulmonary disease) ([facility] [address]     follows with Dr Rose Marie Kim Depression     Diabetes mellitus [facility])     dx 1's per old chart    Hyperlipidemia     Hypertension     Liver cirrhosis ([facility] [address]     Pleural effusion     scheduled for thoracentesis 9/11/2017  has had fluid drained off lung 4 times since CABG    Seizure ([facility] [address]     \"had a diabetic seizure 16 hrs ago- from low sugar\"    Thyroid disease     Wears dentures     full  upper plate    Wears glasses      PSHX:  has a past surgical history that includes Abdominal aortic aneurysm repair; Coronary artery bypass graft (07/25/2017); Mitral valve surgery (07/25/2017); other surgical history; Cholecystectomy; Abdomen surgery; Colonoscopy (04/2010); Cardiac catheterization (07/20/2017); Tubal ligation (age 43's); Appendectomy (1968); Thoracoscopy (Left, 12/07/2017); Cardioversion; and pacemaker placement (06/13/2018). Allergies: Allergies   Allergen Reactions    Ceclor [Cefaclor] Hives    Tape Radha Rojas Tape] Rash     Paper tape ok     Fam HX:  family history includes Alcohol Abuse in her brother, brother, brother, brother, brother, father, and sister; Breast Cancer in her sister; Cancer in her brother.   Soc HX:   Social History     Socioeconomic History    Marital status:      Spouse name: None    Number of children: None    Years of education: None    Highest education level: None   Occupational History    None   Tobacco Use    Smoking status: Former Smoker     Packs/day: 0.50     Years: 17.00     Pack years: 8.50     Types: Cigarettes     Quit date: 2001     Years since quittin.3    Smokeless tobacco: Never Used   Vaping Use    Vaping Use: Former   Substance and Sexual Activity    Alcohol use: No    Drug use: No    Sexual activity: None   Other Topics Concern    None   Social History Narrative    None     Social Determinants of Health     Financial Resource Strain:     Difficulty of Paying Living Expenses: Not on file   Food Insecurity:     Worried About Running Out of Food in the Last Year: Not on file    Angelina of Food in the Last Year: Not on file   Transportation Needs:     Lack of Transportation (Medical): Not on file    Lack of Transportation (Non-Medical):  Not on file   Physical Activity:     Days of Exercise per Week: Not on file    Minutes of Exercise per Session: Not on file   Stress:     Feeling of Stress : Not on file   Social Connections:     Frequency of Communication with Friends and Family: Not on file    Frequency of Social Gatherings with Friends and Family: Not on file    Attends Orthodox Services: Not on file    Active Member of [address] [place] [address] [employer] or Organizations: Not on file    Attends Club or Organization Meetings: Not on file    Marital Status: Not on file   Intimate Partner Violence:     Fear of Current or Ex-Partner: Not on file    Emotionally Abused: Not on file    Physically Abused: Not on file    Sexually Abused: Not on file   Housing Stability:     Unable to Pay for Housing in the Last Year: Not on file    Number of [place] in the Last Year: Not on file    Unstable Housing in the Last Year: Not on file       Medications:   Medications:    sodium chloride flush  5-40 mL IntraVENous 2 times per pneumonia would have a similar appearance. Radiographic follow-up is recommended to ensure resolution of the above findings. Personally reviewed Lab Studies, Imaging, and discussed case with Dr. Neymar Bains my supervising physician.     Electronically signed by TORI Hermosillo NP on 6/25/2022 at 9:53 AM

## 2022-06-25 NOTE — ED PROVIDER NOTES
Emergency Department Encounter  Location: [place] [address] [place] [address]    Patient: Brittany Mackey  MRN: [id number]  : 1945  Date of evaluation: 2022  ED Provider: Hanna Bello DO, FACEP    Chief Complaint:    Shortness of Breath ( a week ago was at her [de-identified] for a check up. Descussed her cough.  was started on an antibiotic and a steroid pack with no releif.  became very anxious last night and called EMS. Denies fever or chills.  is still coughing even though she is taking her pearls. )    [place]:  Brittany Mackey is a 69 y.o. female that presents to the emergency department complaints of shortness of breath that started this morning. She states \"I panicked\". The patient was reported to have an O2 sat of 88% upon arrival by the squad. She denies any chest pain but states she has having some pain around her right shoulder blade. The patient states she is not had a problem like this previously. She states she is coughing up some thick sputum she denies fever or chills. She denies chest pain. She denies nausea vomiting or diarrhea. She is using Tessalon Perles and was placed on steroids by her primary doctor about a week ago. She states this has been going on for little over a week. She arrives by squad. She is complaining of tingling in her ears bilaterally. She denies sore throat      ROS - see HPI, below listed is current ROS at time of my eval:  At least 10 systems reviewed and otherwise acutely negative except as in the [address]   General:  No fevers, no chills, no weakness  Eyes:  No recent vison changes, no discharge  ENT:  No sore throat, no nasal congestion, no hearing changes  Cardiovascular:  No chest pain, no palpitations  Respiratory: Positive for shortness of breath, positive for cough, no wheezing  Gastrointestinal:  No pain, no nausea, no vomiting, no diarrhea  Musculoskeletal:  No muscle pain, no joint pain  Skin:  No rash, no pruritis, no easy Preferred pharmacy verified and updated.    Pt advised to check with pharmacy first before picking up prescriptions.      Pt advised their refill will be addressed within 24-48 hrs.    Please call patient back if any questions, comments or concerns.    Telephone:  [phone]         bruising  Neurologic:  No speech problems, no headache, no extremity numbness, no extremity tingling, no extremity weakness  Psychiatric: Positive for anxiety  Genitourinary:  No dysuria, no hematuria  Endocrine:  No unexpected weight gain, no unexpected weight loss  Extremities:  no edema, no pain    Past Medical History:   Diagnosis Date    Anxiety     per old chart hx of panic attacks    [facility]     Atrial fibrillation with normal ventricular rate [facility] 11/2015    CAD (coronary artery disease)     follows with Dr Roman Moya, bilateral     COPD (chronic obstructive pulmonary disease) [facility]     follows with Dr Brennen Arroyo Depression     Diabetes mellitus [facility])     dx 1's per old chart    Hyperlipidemia     Hypertension     Liver cirrhosis [facility]     Pleural effusion     scheduled for thoracentesis 9/11/2017  has had fluid drained off lung 4 times since CABG    Seizure ([facility]     \"had a diabetic seizure 16 hrs ago- from low sugar\"    Thyroid disease     Wears dentures     full  upper plate    Wears glasses      Past Surgical History:   Procedure Laterality Date    ABDOMEN SURGERY      per old chart pt had exp. laparotomy and appy done 1966\"the appendix was wrapped around the colon\"   [address] CATHETERIZATION  07/20/2017    Dr. Riley Terrell. Diagnostic only, no intervention done.  CARDIOVERSION      CHOLECYSTECTOMY      1998    COLONOSCOPY  04/2010    CORONARY ARTERY BYPASS GRAFT  07/25/2017    [place] to [place]. Done per Dr. Benito Hamilton with mitral valve repair     MITRAL VALVE SURGERY  07/25/2017    MV repair with ring     OTHER SURGICAL HISTORY      per old chart pt had exc. fistula anal canal with Dr Carmen Martines done 4/2010    PACEMAKER PLACEMENT  06/13/2018    THORACOSCOPY Left 12/07/2017    with decortication     TUBAL LIGATION  age 43's     Family History   Problem Relation Age of Onset    Alcohol Abuse Father     Breast Cancer Sister     Alcohol Abuse Sister     Alcohol Abuse Brother     Cancer Brother         prostate    Alcohol Abuse Brother     Alcohol Abuse Brother     Alcohol Abuse Brother     Alcohol Abuse Brother      Social History     Socioeconomic History    Marital status:      Spouse name: Not on file    Number of children: Not on file    Years of education: Not on file    Highest education level: Not on file   Occupational History    Not on file   Tobacco Use    Smoking status: Former Smoker     Packs/day: 0.50     Years: 17.00     Pack years: 8.50     Types: Cigarettes     Quit date: 2001     Years since quittin.3    Smokeless tobacco: Never Used   Vaping Use    Vaping Use: Former   Substance and Sexual Activity    Alcohol use: No    Drug use: No    Sexual activity: Not on file   Other Topics Concern    Not on file   Social History Narrative    Not on file     Social Determinants of Health     Financial Resource Strain:     Difficulty of Paying Living Expenses: Not on file   Food Insecurity:     Worried About [address] [employer] in the Last Year: Not on file    [address] in the Last Year: Not on file   Transportation Needs:     Lack of Transportation (Medical): Not on file    Lack of Transportation (Non-Medical):  Not on file   Physical Activity:     Days of Exercise per Week: Not on file    Minutes of Exercise per Session: Not on file   Stress:     Feeling of Stress : Not on file   Social Connections:     Frequency of Communication with Friends and Family: Not on file    Frequency of Social Gatherings with Friends and Family: Not on file    Attends Buddhist Services: Not on file    Active Member of Clubs or Organizations: Not on file    Attends Club or Organization Meetings: Not on file    Marital Status: Not on file   Intimate Partner Violence:     Fear of Current or Ex-Partner: Not on file    Emotionally Abused: Not on file   Mikaela Lechuga Physically Abused: Not on file    Sexually Abused: Not on file   Housing Stability:     Unable to Pay for Housing in the Last Year: Not on file    Number of Places Lived in the Last Year: Not on file    Unstable Housing in the Last Year: Not on file     Current Facility-Administered Medications   Medication Dose Route Frequency Provider Last Rate Last Admin    levoFLOXacin (LEVAQUIN) tablet 500 mg  500 mg Oral Daily Jacklyn Spring, DO   500 mg at 06/25/22 0848     Current Outpatient Medications   Medication Sig Dispense Refill    benzonatate (TESSALON) 100 MG capsule Take 100 mg by mouth 3 times daily as needed      albuterol sulfate HFA (PROVENTIL;VENTOLIN;PROAIR) 108 (90 Base) MCG/ACT inhaler       methylPREDNISolone (MEDROL DOSEPACK) 4 MG tablet       allopurinol (ZYLOPRIM) 100 MG tablet Take 100 mg by mouth daily      lactulose (CHRONULAC) 10 GM/15ML solution Take 15 mLs by mouth daily      potassium chloride (MICRO-K) 10 MEQ extended release capsule Take 10 mEq by mouth daily      rifaximin (XIFAXAN) 550 MG tablet Take 1 tablet by mouth 2 times daily      pregabalin (LYRICA) 75 MG capsule Take 1 capsule by mouth 3 times daily for 30 days.  90 capsule 0    atorvastatin (LIPITOR) 20 MG tablet Take 1 tablet by mouth nightly 30 tablet 3    metoprolol tartrate (LOPRESSOR) 25 MG tablet Take 1 tablet by mouth 2 times daily 60 tablet 3    FLUoxetine (PROZAC) 20 MG capsule Take 1 capsule by mouth daily 30 capsule 0    furosemide (LASIX) 40 MG tablet Take 1 tablet by mouth 2 times daily 60 tablet 0    spironolactone (ALDACTONE) 25 MG tablet Take 1 tablet by mouth 2 times daily (Patient taking differently: Take 100 mg by mouth 2 times daily ) 60 tablet 0    warfarin (COUMADIN) 2.5 MG tablet Take one tablet in the evening every day  Please check INR tomorrow at 1/16/2018 with  office and dose of coumadin may need to be adjusted (Patient taking differently: 2.5 mg daily except on Thursdays she takes 5 mg) 30 tablet 0    sotalol (BETAPACE) 80 MG tablet Take 0.5 tablets by mouth 2 times daily 60 tablet 3    levETIRAcetam (KEPPRA) 750 MG tablet TK  1 T PO BID  2    metFORMIN (GLUCOPHAGE) 1000 MG tablet 750 mg  4    glimepiride (AMARYL) 2 MG tablet Take 1 mg by mouth every morning (before breakfast)       aspirin 81 MG EC tablet Take 1 tablet by mouth daily 30 tablet 3    ipratropium-albuterol (DUONEB) 0.5-2.5 (3) MG/3ML SOLN nebulizer solution Take 1 vial by nebulization every 4 hours as needed       budesonide-formoterol (SYMBICORT) 160-4.5 MCG/ACT AERO Inhale 2 puffs into the lungs 2 times daily 1 Inhaler 5    linagliptin (TRADJENTA) 5 MG tablet Take 5 mg by mouth daily      LORazepam (ATIVAN) 1 MG tablet Take 1 mg by mouth every 6 hours as needed for Anxiety Up to 4 a day.  levothyroxine (SYNTHROID) 137 MCG tablet Take 125 mcg by mouth Daily        Allergies   Allergen Reactions    Ceclor [Cefaclor] Hives    Tape [Adhesive Tape] Rash     Paper tape ok       Nursing Notes Reviewed    Physical Exam:  ED Triage Vitals [06/25/22 0735]   Enc Vitals Group      BP (!) 174/73      Heart Rate 68      Resp 16      Temp 98.4 °F (36.9 °C)      Temp Source Oral      SpO2 94 %      Weight 187 lb (84.8 kg)      Height 5' 6\" (1.676 m)      Head Circumference       Peak Flow       Pain Score       Pain Loc       Pain Edu? Excl. in [address]? GENERAL APPEARANCE: Awake and alert. Cooperative. No acute distress. Nontoxic in appearance  HEAD: Normocephalic. Atraumatic. EYES: EOM's grossly intact. Sclera anicteric. ENT: Tolerates saliva. No trismus. Tympanic membranes did show small amount of fluid behind them bilaterally. Pharynx is nonerythematous and without exudate. Oral mucosa is moist  NECK: Supple. Trachea midline. CARDIO: RRR. Radial pulse 2+. LUNGS: Respirations unlabored. CTAB. No wheezes rales or rhonchi. ABDOMEN: Soft. Non-distended. Non-tender.   No guarding or rebound  EXTREMITIES: No acute deformities. No swelling in lower extremities and no pain to palpation to her calves. SKIN: Warm and dry. NEUROLOGICAL: No gross facial drooping. Moves all 4 extremities spontaneously. PSYCHIATRIC: Normal mood.      Labs:  Results for orders placed or performed during the hospital encounter of 06/25/22   Respiratory Panel, Molecular, with COVID-19 (Restricted: peds pts or suitable admitted adults)    Specimen: Nasopharyngeal   Result Value Ref Range    Adenovirus Detection by PCR NOT DETECTED NOT DETECTED    Coronavirus 229E PCR NOT DETECTED NOT DETECTED    Coronavirus HKU1 PCR NOT DETECTED NOT DETECTED    Coronavirus NL63 PCR NOT DETECTED NOT DETECTED    Coronavirus OC43 PCR NOT DETECTED NOT DETECTED    SARS-CoV-2 NOT DETECTED NOT DETECTED    Human Metapneumovirus PCR NOT DETECTED NOT DETECTED    Rhinovirus Enterovirus PCR NOT DETECTED NOT DETECTED    Influenza A by PCR NOT DETECTED NOT DETECTED    Influenza A H1 Pandemic PCR NOT DETECTED NOT DETECTED    Influenza A H1 (2009) PCR NOT DETECTED NOT DETECTED    Influenza A H3 PCR NOT DETECTED NOT DETECTED    Influenza B by PCR NOT DETECTED NOT DETECTED    Parainfluenza 1 PCR NOT DETECTED NOT DETECTED    Parainfluenza 2 PCR NOT DETECTED NOT DETECTED    Parainfluenza 3 PCR NOT DETECTED NOT DETECTED    Parainfluenza 4 PCR NOT DETECTED NOT DETECTED    RSV PCR NOT DETECTED NOT DETECTED    Bordetella parapertussis by PCR NOT DETECTED NOT DETECTED    B Pertussis by PCR NOT DETECTED NOT DETECTED    Chlamydophila Pneumonia PCR NOT DETECTED NOT DETECTED    Mycoplasma pneumo by PCR NOT DETECTED NOT DETECTED   Brain Natriuretic Peptide   Result Value Ref Range    Pro-BNP 5,686 (H) <300 PG/ML   CBC with Auto Differential   Result Value Ref Range    WBC 9.2 4.0 - 10.5 K/CU MM    RBC 4.42 4.2 - 5.4 M/CU MM    Hemoglobin 14.4 12.5 - 16.0 GM/DL    Hematocrit 44.0 37 - 47 %    MCV 99.5 78 - 100 FL    MCH 32.6 (H) 27 - 31 PG    MCHC 32.7 32.0 - 36.0 %    RDW 13.8 11.7 - 14.9 %    Platelets 512 810 - 973 K/CU MM    MPV 10.5 7.5 - 11.1 FL    Differential Type AUTOMATED DIFFERENTIAL     Segs Relative 75.1 (H) 36 - 66 %    Lymphocytes % 12.4 (L) 24 - 44 %    Monocytes % 8.2 (H) 0 - 4 %    Eosinophils % 0.8 0 - 3 %    Basophils % 0.8 0 - 1 %    Segs Absolute 6.9 K/CU MM    Lymphocytes Absolute 1.2 K/CU MM    Monocytes Absolute 0.8 K/CU MM    Eosinophils Absolute 0.1 K/CU MM    Basophils Absolute 0.1 K/CU MM    Immature Neutrophil % 2.7 (H) 0 - 0.43 %    Total Immature Neutrophil 0.25 K/CU MM   Comprehensive Metabolic Panel   Result Value Ref Range    Sodium 138 135 - 145 MMOL/L    Potassium 4.0 3.5 - 5.1 MMOL/L    Chloride 100 99 - 110 mMol/L    CO2 28 21 - 32 MMOL/L    BUN 12 6 - 23 MG/DL    CREATININE 1.0 0.6 - 1.1 MG/DL    Glucose 144 (H) 70 - 99 MG/DL    Calcium 9.1 8.3 - 10.6 MG/DL    Albumin 3.5 3.4 - 5.0 GM/DL    Total Protein 5.9 (L) 6.4 - 8.2 GM/DL    Total Bilirubin 1.1 (H) 0.0 - 1.0 MG/DL    ALT 20 10 - 40 U/L    AST 24 15 - 37 IU/L    Alkaline Phosphatase 78 40 - 129 IU/L    GFR Non- 54 (L) >60 mL/min/1.73m2    GFR African American >60 >60 mL/min/1.73m2    Anion Gap 10 4 - 16   Troponin   Result Value Ref Range    Troponin T <0.010 <0.01 NG/ML   EKG 12 Lead   Result Value Ref Range    Ventricular Rate 66 BPM    Atrial Rate 66 BPM    P-R Interval 152 ms    QRS Duration 80 ms    Q-T Interval 412 ms    QTc Calculation (Bazett) 431 ms    P Axis 45 degrees    R Axis 40 degrees    T Axis -17 degrees    Diagnosis       Normal sinus rhythm  ST & T wave abnormality, consider anterior ischemia  Abnormal ECG  When compared with ECG of 12-APR-2019 09:22,  Sinus rhythm has replaced Electronic atrial pacemaker  T wave inversion now evident in Inferior leads         EKG (if obtained): (All EKG's are interpreted by myself in the absence of a cardiologist)  The Ekg interpreted by me in the absence of a cardiologist shows.   normal sinus rhythm with a rate of 66  Axis is Normal  QTc is  431  Intervals and Durations are unremarkable. No specific ST-T wave changes appreciated. significant change from prior EKG dated 12 April 2019. There is now Q wave in version in leads III and aVF      Radiographs (if obtained):  [] The following radiograph was interpreted by myself in the absence of a radiologist:  [x] Radiologist's Report reviewed at time of ED visit:  XR CHEST PORTABLE   Preliminary Result   Findings are most consistent with congestive heart failure. More prominent focal airspace disease of the right lower lobe the could   indicate asymmetric pulmonary edema although pneumonia would have a similar   appearance. Radiographic follow-up is recommended to ensure resolution of the above   findings. ED Course and MDM:  Patient's x-ray shows prominent airspace disease in right lower lobe which could be pneumonia but pulmonary edema could look like this as well. The patient has been having symptoms of cough productive of thick sputum which is consistent with pneumonia although her white count and temperature are normal.  She does have a proBNP of 5600 and I think she does have a component of CHF. I think there is pneumonia and CHF going on in this patient. She desatted to 89% when she got up to go to the bathroom. I am recommending admission. I discussed her case with Marina who is on-call for the hospitalist service. She is agreed to admit this patient for continued valuation treatment. Here in the ER the patient received Levaquin 500 mg p.o. and Lasix 40 mg IV. She will be admitted in stable condition. Final Impression:  1. Acute congestive heart failure, unspecified heart failure type ([address]    2. Pneumonia of right lower lobe due to infectious organism    3. Hypoxia      DISPOSITION Decision To Admit    Patient referred to: No follow-up provider specified.   Discharge medications:  New Prescriptions    No medications on file     (Please note that portions of this note may have been completed with a voice recognition program. Efforts were made to edit the dictations but occasionally words are mis-transcribed.)    Cody Schaeffer DO, [facility]  Board certified in [facility]ervin [place], [place]  06/25/22 2988

## 2022-06-26 ENCOUNTER — APPOINTMENT (OUTPATIENT)
Dept: GENERAL RADIOLOGY | Age: 77
DRG: 291 | End: 2022-06-26
Payer: MEDICARE

## 2022-06-26 LAB
ANION GAP SERPL CALCULATED.3IONS-SCNC: 9 MMOL/L (ref 4–16)
BUN BLDV-MCNC: 12 MG/DL (ref 6–23)
CALCIUM SERPL-MCNC: 9.2 MG/DL (ref 8.3–10.6)
CHLORIDE BLD-SCNC: 99 MMOL/L (ref 99–110)
CHOLESTEROL: 102 MG/DL
CO2: 29 MMOL/L (ref 21–32)
CREAT SERPL-MCNC: 1.1 MG/DL (ref 0.6–1.1)
DIFFERENTIAL TYPE: ABNORMAL
ESTIMATED AVERAGE GLUCOSE: 157 MG/DL
GFR AFRICAN AMERICAN: 58 ML/MIN/1.73M2
GFR NON-AFRICAN AMERICAN: 48 ML/MIN/1.73M2
GLUCOSE BLD-MCNC: 143 MG/DL (ref 70–99)
GLUCOSE BLD-MCNC: 147 MG/DL (ref 70–99)
GLUCOSE BLD-MCNC: 172 MG/DL (ref 70–99)
GLUCOSE BLD-MCNC: 180 MG/DL (ref 70–99)
GLUCOSE BLD-MCNC: 193 MG/DL (ref 70–99)
HBA1C MFR BLD: 7.1 % (ref 4.2–6.3)
HCT VFR BLD CALC: 42 % (ref 37–47)
HDLC SERPL-MCNC: 40 MG/DL
HEMOGLOBIN: 14.1 GM/DL (ref 12.5–16)
INR BLD: 1.99 INDEX
LDL CHOLESTEROL CALCULATED: 28 MG/DL
LYMPHOCYTES ABSOLUTE: 1.1 K/CU MM
LYMPHOCYTES RELATIVE PERCENT: 15 % (ref 24–44)
MAGNESIUM: 1.9 MG/DL (ref 1.8–2.4)
MCH RBC QN AUTO: 32.7 PG (ref 27–31)
MCHC RBC AUTO-ENTMCNC: 33.6 % (ref 32–36)
MCV RBC AUTO: 97.4 FL (ref 78–100)
METAMYELOCYTES ABSOLUTE COUNT: 0.08 K/CU MM
METAMYELOCYTES PERCENT: 1 %
MONOCYTES ABSOLUTE: 0.5 K/CU MM
MONOCYTES RELATIVE PERCENT: 7 % (ref 0–4)
PDW BLD-RTO: 13.6 % (ref 11.7–14.9)
PLATELET # BLD: 146 K/CU MM (ref 140–440)
PLT MORPHOLOGY: ABNORMAL
PMV BLD AUTO: 10.3 FL (ref 7.5–11.1)
POTASSIUM SERPL-SCNC: 3.8 MMOL/L (ref 3.5–5.1)
PROTHROMBIN TIME: 24.8 SECONDS (ref 11.7–14.5)
RBC # BLD: 4.31 M/CU MM (ref 4.2–5.4)
RBC # BLD: ABNORMAL 10*6/UL
SEGMENTED NEUTROPHILS ABSOLUTE COUNT: 5.8 K/CU MM
SEGMENTED NEUTROPHILS RELATIVE PERCENT: 77 % (ref 36–66)
SODIUM BLD-SCNC: 137 MMOL/L (ref 135–145)
TRIGL SERPL-MCNC: 171 MG/DL
WBC # BLD: 7.5 K/CU MM (ref 4–10.5)

## 2022-06-26 PROCEDURE — 85027 COMPLETE CBC AUTOMATED: CPT

## 2022-06-26 PROCEDURE — 1200000000 HC SEMI PRIVATE

## 2022-06-26 PROCEDURE — 94640 AIRWAY INHALATION TREATMENT: CPT

## 2022-06-26 PROCEDURE — 85007 BL SMEAR W/DIFF WBC COUNT: CPT

## 2022-06-26 PROCEDURE — 6360000002 HC RX W HCPCS: Performed by: NURSE PRACTITIONER

## 2022-06-26 PROCEDURE — 83735 ASSAY OF MAGNESIUM: CPT

## 2022-06-26 PROCEDURE — 71046 X-RAY EXAM CHEST 2 VIEWS: CPT

## 2022-06-26 PROCEDURE — 94761 N-INVAS EAR/PLS OXIMETRY MLT: CPT

## 2022-06-26 PROCEDURE — 2500000003 HC RX 250 WO HCPCS: Performed by: NURSE PRACTITIONER

## 2022-06-26 PROCEDURE — 80048 BASIC METABOLIC PNL TOTAL CA: CPT

## 2022-06-26 PROCEDURE — 2700000000 HC OXYGEN THERAPY PER DAY

## 2022-06-26 PROCEDURE — 6370000000 HC RX 637 (ALT 250 FOR IP): Performed by: NURSE PRACTITIONER

## 2022-06-26 PROCEDURE — 82962 GLUCOSE BLOOD TEST: CPT

## 2022-06-26 PROCEDURE — 80061 LIPID PANEL: CPT

## 2022-06-26 PROCEDURE — 36415 COLL VENOUS BLD VENIPUNCTURE: CPT

## 2022-06-26 PROCEDURE — 85610 PROTHROMBIN TIME: CPT

## 2022-06-26 PROCEDURE — 2580000003 HC RX 258: Performed by: NURSE PRACTITIONER

## 2022-06-26 RX ADMIN — INSULIN LISPRO 2 UNITS: 100 INJECTION, SOLUTION INTRAVENOUS; SUBCUTANEOUS at 17:12

## 2022-06-26 RX ADMIN — SOTALOL HYDROCHLORIDE 40 MG: 80 TABLET ORAL at 08:46

## 2022-06-26 RX ADMIN — LACTULOSE 10 G: 10 SOLUTION ORAL at 08:40

## 2022-06-26 RX ADMIN — INSULIN LISPRO 1 UNITS: 100 INJECTION, SOLUTION INTRAVENOUS; SUBCUTANEOUS at 20:15

## 2022-06-26 RX ADMIN — DOXYCYCLINE 100 MG: 100 INJECTION, POWDER, LYOPHILIZED, FOR SOLUTION INTRAVENOUS at 02:09

## 2022-06-26 RX ADMIN — IPRATROPIUM BROMIDE AND ALBUTEROL SULFATE 1 AMPULE: 2.5; .5 SOLUTION RESPIRATORY (INHALATION) at 11:37

## 2022-06-26 RX ADMIN — RIFAXIMIN 550 MG: 550 TABLET ORAL at 08:39

## 2022-06-26 RX ADMIN — LEVOTHYROXINE SODIUM 125 MCG: 0.12 TABLET ORAL at 05:24

## 2022-06-26 RX ADMIN — SODIUM CHLORIDE, PRESERVATIVE FREE 10 ML: 5 INJECTION INTRAVENOUS at 08:40

## 2022-06-26 RX ADMIN — ALLOPURINOL 100 MG: 100 TABLET ORAL at 08:40

## 2022-06-26 RX ADMIN — DOXYCYCLINE 100 MG: 100 INJECTION, POWDER, LYOPHILIZED, FOR SOLUTION INTRAVENOUS at 15:03

## 2022-06-26 RX ADMIN — FUROSEMIDE 40 MG: 10 INJECTION, SOLUTION INTRAMUSCULAR; INTRAVENOUS at 17:12

## 2022-06-26 RX ADMIN — IPRATROPIUM BROMIDE AND ALBUTEROL SULFATE 1 AMPULE: 2.5; .5 SOLUTION RESPIRATORY (INHALATION) at 15:43

## 2022-06-26 RX ADMIN — SODIUM CHLORIDE 250 ML: 9 INJECTION, SOLUTION INTRAVENOUS at 15:01

## 2022-06-26 RX ADMIN — BUDESONIDE AND FORMOTEROL FUMARATE DIHYDRATE 2 PUFF: 80; 4.5 AEROSOL RESPIRATORY (INHALATION) at 19:08

## 2022-06-26 RX ADMIN — SPIRONOLACTONE 100 MG: 50 TABLET ORAL at 17:12

## 2022-06-26 RX ADMIN — LORAZEPAM 1 MG: 1 TABLET ORAL at 20:10

## 2022-06-26 RX ADMIN — SODIUM CHLORIDE 25 ML: 9 INJECTION, SOLUTION INTRAVENOUS at 02:07

## 2022-06-26 RX ADMIN — SODIUM CHLORIDE, PRESERVATIVE FREE 10 ML: 5 INJECTION INTRAVENOUS at 20:10

## 2022-06-26 RX ADMIN — ATORVASTATIN CALCIUM 20 MG: 20 TABLET, FILM COATED ORAL at 20:09

## 2022-06-26 RX ADMIN — INSULIN LISPRO 1 UNITS: 100 INJECTION, SOLUTION INTRAVENOUS; SUBCUTANEOUS at 08:46

## 2022-06-26 RX ADMIN — WARFARIN SODIUM 2.5 MG: 2.5 TABLET ORAL at 17:12

## 2022-06-26 RX ADMIN — FUROSEMIDE 40 MG: 10 INJECTION, SOLUTION INTRAMUSCULAR; INTRAVENOUS at 08:40

## 2022-06-26 RX ADMIN — INSULIN LISPRO 2 UNITS: 100 INJECTION, SOLUTION INTRAVENOUS; SUBCUTANEOUS at 12:37

## 2022-06-26 RX ADMIN — LEVETIRACETAM 750 MG: 500 TABLET, FILM COATED ORAL at 08:39

## 2022-06-26 RX ADMIN — FLUOXETINE 20 MG: 20 CAPSULE ORAL at 08:39

## 2022-06-26 RX ADMIN — LEVETIRACETAM 750 MG: 500 TABLET, FILM COATED ORAL at 20:09

## 2022-06-26 RX ADMIN — SPIRONOLACTONE 100 MG: 50 TABLET ORAL at 08:39

## 2022-06-26 RX ADMIN — RIFAXIMIN 550 MG: 550 TABLET ORAL at 20:10

## 2022-06-26 RX ADMIN — BUDESONIDE AND FORMOTEROL FUMARATE DIHYDRATE 2 PUFF: 80; 4.5 AEROSOL RESPIRATORY (INHALATION) at 08:03

## 2022-06-26 RX ADMIN — IPRATROPIUM BROMIDE AND ALBUTEROL SULFATE 1 AMPULE: 2.5; .5 SOLUTION RESPIRATORY (INHALATION) at 08:01

## 2022-06-26 RX ADMIN — SOTALOL HYDROCHLORIDE 40 MG: 80 TABLET ORAL at 20:09

## 2022-06-26 RX ADMIN — IPRATROPIUM BROMIDE AND ALBUTEROL SULFATE 1 AMPULE: 2.5; .5 SOLUTION RESPIRATORY (INHALATION) at 19:08

## 2022-06-26 ASSESSMENT — PAIN SCALES - GENERAL
PAINLEVEL_OUTOF10: 0

## 2022-06-26 NOTE — PROGRESS NOTES
PHARMACY ANTICOAGULATION MONITORING SERVICE    Jhonatan Evans is a 69 y.o. female on warfarin therapy for CHF. Pharmacy consulted by Ryan WU for monitoring and adjustment of treatment. Indication for anticoagulation: CHF  INR goal: 2-3  Warfarin dose prior to admission: 2.5mg every day except 5mg on Tue and Thurs. Pertinent Laboratory Values   Recent Labs     06/25/22  0800 06/25/22  1155 06/26/22  0600   INR  --    < > 1.99   HGB 14.4   < > 14.1   HCT 44.0   < > 42.0     --  146    < > = values in this interval not displayed. Assessment/Plan:   Drug Interactions: Tylenol, allopurinol, levothyroxine, Prozac and doxycycline.  INR 2-3   Continue warfarin dose that patient was taking at home. [address] [place] [address] will continue to monitor and adjust warfarin therapy as indicated    Thank you for the consult.   [address] [place] [address]  6/26/2022 8:18 AM

## 2022-06-26 NOTE — PLAN OF CARE
Problem: Discharge Planning  Goal: Discharge to home or other facility with appropriate resources  6/25/2022 2116 by Jian Palumbo RN  Outcome: Progressing  6/25/2022 1116 by Lurline Hammans, RN  Outcome: Progressing  Flowsheets (Taken 6/25/2022 1115)  Discharge to home or other facility with appropriate resources: Identify barriers to discharge with patient and caregiver     Problem: Pain  Goal: Verbalizes/displays adequate comfort level or baseline comfort level  6/25/2022 2116 by Jian Palumbo RN  Outcome: Progressing  6/25/2022 1116 by Lurline Hammans, RN  Outcome: Progressing     Problem: ABCDS Injury Assessment  Goal: Absence of physical injury  6/25/2022 2116 by Jian Palumbo RN  Outcome: Progressing  6/25/2022 1116 by Lurline Hammans, RN  Outcome: Progressing

## 2022-06-26 NOTE — PLAN OF CARE
Problem: Discharge Planning  Goal: Discharge to home or other facility with appropriate resources  6/26/2022 0945 by Ricky Farias RN  Outcome: Progressing  6/25/2022 2116 by Jackie Hernández RN  Outcome: Progressing     Problem: Pain  Goal: Verbalizes/displays adequate comfort level or baseline comfort level  6/26/2022 0945 by Ricky Farias RN  Outcome: Progressing  6/25/2022 2116 by Jackie Hernández RN  Outcome: Progressing     Problem: ABCDS Injury Assessment  Goal: Absence of physical injury  6/26/2022 0945 by Ricky Farias RN  Outcome: Progressing  6/25/2022 2116 by Jackie Hernández RN  Outcome: Progressing

## 2022-06-26 NOTE — PROGRESS NOTES
[] Eliquis, [] Xarelto  [x] Coumadin   Code Status Full Code   Disposition From: Home  Expected Disposition: Home  Estimated Date of Discharge: TBD  Patient requires continued admission due to CHF   Surrogate Decision Maker/ Cherry nance son, no ACP docs on file     Subjective:     Chief Complaint: Shortness of Breath ( a week ago was at her [de-identified] for a check up. Descussed her cough.  was started on an antibiotic and a steroid pack with no releif.  became very anxious last night and called EMS. Denies fever or chills.  is still coughing even though she is taking her pearls. )     Patient seen and examined today, she states she is feeling much better. She has no complaints of any chest pain no abdominal pain no nausea no vomiting no diarrhea. She continues to require 1 L of oxygen, baseline is room air. Continue current treatment. Review of Systems:      10 point review of systems completed and is negative unless stated above. Objective: Intake/Output Summary (Last 24 hours) at 6/26/2022 1059  Last data filed at 6/26/2022 1020  Gross per 24 hour   Intake 1800 ml   Output 3400 ml   Net -1600 ml        Vitals:   Vitals:    06/26/22 0804   BP:    Pulse:    Resp:    Temp:    SpO2: 98%       Physical Exam:     General: NAD  Eyes: EOMI  ENT: neck supple  Cardiovascular: Regular rate. Respiratory: Clear to auscultation still diminished bilaterally  Gastrointestinal: Soft, non tender  Genitourinary: no suprapubic tenderness  Musculoskeletal: No edema  Skin: warm, dry  Neuro: Alert. Psych: Mood appropriate.      Medications:   Medications:    sodium chloride flush  5-40 mL IntraVENous 2 times per day    furosemide  40 mg IntraVENous BID    ipratropium-albuterol  1 ampule Inhalation Q4H WA    allopurinol  100 mg Oral Daily    atorvastatin  20 mg Oral Nightly    budesonide-formoterol  2 puff Inhalation BID    FLUoxetine  20 mg Oral Daily    lactulose  15 mL Oral Daily  levETIRAcetam  750 mg Oral BID    levothyroxine  125 mcg Oral Daily    [Held by provider] metoprolol tartrate  25 mg Oral BID    rifAXIMin  550 mg Oral BID    sotalol  40 mg Oral BID    spironolactone  100 mg Oral BID    warfarin  2.5 mg Oral Once per day on Sun Mon Wed Fri Sat    [START ON 6/28/2022] warfarin  5 mg Oral Once per day on Tue Thu    doxycycline (VIBRAMYCIN) IV  100 mg IntraVENous Q12H    insulin lispro  0-12 Units SubCUTAneous TID WC    insulin lispro  0-6 Units SubCUTAneous Nightly      Infusions:    sodium chloride 25 mL (06/26/22 0207)    dextrose       PRN Meds: sodium chloride flush, 5-40 mL, PRN  sodium chloride, 250 mL, PRN  polyethylene glycol, 17 g, Daily PRN  acetaminophen, 650 mg, Q6H PRN   Or  acetaminophen, 650 mg, Q6H PRN  albuterol sulfate HFA, 1 puff, Q4H PRN  benzonatate, 100 mg, TID PRN  LORazepam, 1 mg, Q6H PRN  glucose, 4 tablet, PRN  dextrose bolus, 125 mL, PRN   Or  dextrose bolus, 250 mL, PRN  glucagon (rDNA), 1 mg, PRN  dextrose, 100 mL/hr, PRN        Labs      Recent Results (from the past 24 hour(s))   Protime-INR    Collection Time: 06/25/22 11:55 AM   Result Value Ref Range    Protime 30.7 (H) 11.7 - 14.5 SECONDS    INR 2.45 INDEX   POCT Glucose    Collection Time: 06/25/22  4:32 PM   Result Value Ref Range    POC Glucose 95 70 - 99 MG/DL   POCT Glucose    Collection Time: 06/25/22  8:22 PM   Result Value Ref Range    POC Glucose 127 (H) 70 - 99 MG/DL   Basic Metabolic Panel    Collection Time: 06/26/22  6:00 AM   Result Value Ref Range    Sodium 137 135 - 145 MMOL/L    Potassium 3.8 3.5 - 5.1 MMOL/L    Chloride 99 99 - 110 mMol/L    CO2 29 21 - 32 MMOL/L    Anion Gap 9 4 - 16    BUN 12 6 - 23 MG/DL    CREATININE 1.1 0.6 - 1.1 MG/DL    Glucose 143 (H) 70 - 99 MG/DL    Calcium 9.2 8.3 - 10.6 MG/DL    GFR Non- 48 (L) >60 mL/min/1.73m2    GFR  58 (L) >60 mL/min/1.73m2   Magnesium    Collection Time: 06/26/22  6:00 AM   Result Value Ref Range    Magnesium 1.9 1.8 - 2.4 mg/dl   Lipid panel - fasting    Collection Time: 06/26/22  6:00 AM   Result Value Ref Range    Triglycerides 171 (H) <150 MG/DL    Cholesterol 102 <200 MG/DL    HDL 40 (L) >40 MG/DL    LDL Calculated 28 <100 MG/DL   CBC with Auto Differential    Collection Time: 06/26/22  6:00 AM   Result Value Ref Range    WBC 7.5 4.0 - 10.5 K/CU MM    RBC 4.31 4.2 - 5.4 M/CU MM    Hemoglobin 14.1 12.5 - 16.0 GM/DL    Hematocrit 42.0 37 - 47 %    MCV 97.4 78 - 100 FL    MCH 32.7 (H) 27 - 31 PG    MCHC 33.6 32.0 - 36.0 %    RDW 13.6 11.7 - 14.9 %    Platelets 631 320 - 677 K/CU MM    MPV 10.3 7.5 - 11.1 FL    Metamyelocytes Relative 1 (H) 0.0 %    Segs Relative 77.0 (H) 36 - 66 %    Lymphocytes % 15.0 (L) 24 - 44 %    Monocytes % 7.0 (H) 0 - 4 %    Metamyelocytes Absolute 0.08 K/CU MM    Segs Absolute 5.8 K/CU MM    Lymphocytes Absolute 1.1 K/CU MM    Monocytes Absolute 0.5 K/CU MM    Differential Type MANUAL DIFFERENTIAL     RBC Morphology RBC MORPHOLOGY NORMAL     PLT Morphology PLATELETS NORMAL IN NUMBER AND SIZE    Protime-INR    Collection Time: 06/26/22  6:00 AM   Result Value Ref Range    Protime 24.8 (H) 11.7 - 14.5 SECONDS    INR 1.99 INDEX   POCT Glucose    Collection Time: 06/26/22  7:58 AM   Result Value Ref Range    POC Glucose 147 (H) 70 - 99 MG/DL        Imaging/Diagnostics Last 24 Hours   XR CHEST (2 VW)    Result Date: 6/26/2022  EXAMINATION: TWO XRAY VIEWS OF THE CHEST 6/26/2022 8:28 am COMPARISON: Chest radiograph 06/25/2022, chest CTA 03/23/2022 HISTORY: ORDERING SYSTEM PROVIDED HISTORY: re-eval pulm edema TECHNOLOGIST PROVIDED HISTORY: Reason for exam:->re-eval pulm edema FINDINGS: Unchanged left subclavian dual chamber pacemaker. Changes of mitral valve replacement coronary artery bypass grafting. Overlying heart monitor leads and tubing. Decreased bilateral perihilar and basilar airspace opacities.   Unchanged scarring in lateral base of the lingula better demonstrated on CT. Diffuse interstitial prominence with indistinct pulmonary vasculature but no definite interlobular septal thickening. No findings of pneumothorax or pleural effusion. Prominence of the pulmonary trunk that can be seen with pulmonary hypertension. Suboptimally seen aneurysmal dilation the thoracic aorta near the junction of the arch and descending aorta. Mild cardiomegaly. Prominent epicardial fat bilaterally. Pulmonary vascular congestion with decreased central predominant edema, suggesting improved congestive heart failure given mild cardiomegaly and prior heart surgery. Pneumonia could appear similar, and basilar atelectasis may be present. XR CHEST PORTABLE    Result Date: 6/25/2022  EXAMINATION: ONE XRAY VIEW OF THE CHEST 6/25/2022 8:14 am COMPARISON: February 4, 2021 HISTORY: ORDERING SYSTEM PROVIDED HISTORY: chest pain TECHNOLOGIST PROVIDED HISTORY: Reason for exam:->chest pain Reason for Exam: sob, hx COPD Additional signs and symptoms: sob, hx COPD Relevant Medical/Surgical History: sob, hx COPD FINDINGS: The cardiomediastinal silhouette is moderately enlarged. Stable post sternotomy changes are present. Pacer leads are unchanged in position. Vascular congestion noted with mild pulmonary edema and small bilateral pleural effusions. More focal area of airspace disease involving the right lower lobe. No evidence of pneumothorax. Findings are most consistent with congestive heart failure. More prominent focal airspace disease of the right lower lobe the could indicate asymmetric pulmonary edema although pneumonia would have a similar appearance. Radiographic follow-up is recommended to ensure resolution of the above findings.        Electronically signed by TORI Denny NP on 6/26/2022 at 10:59 AM

## 2022-06-27 VITALS
WEIGHT: 183.3 LBS | DIASTOLIC BLOOD PRESSURE: 73 MMHG | HEART RATE: 84 BPM | OXYGEN SATURATION: 96 % | RESPIRATION RATE: 18 BRPM | SYSTOLIC BLOOD PRESSURE: 118 MMHG | BODY MASS INDEX: 29.46 KG/M2 | TEMPERATURE: 97 F | HEIGHT: 66 IN

## 2022-06-27 LAB
ANION GAP SERPL CALCULATED.3IONS-SCNC: 12 MMOL/L (ref 4–16)
BUN BLDV-MCNC: 14 MG/DL (ref 6–23)
CALCIUM SERPL-MCNC: 9.1 MG/DL (ref 8.3–10.6)
CHLORIDE BLD-SCNC: 99 MMOL/L (ref 99–110)
CO2: 26 MMOL/L (ref 21–32)
CREAT SERPL-MCNC: 1.1 MG/DL (ref 0.6–1.1)
CULTURE: NORMAL
GFR AFRICAN AMERICAN: 58 ML/MIN/1.73M2
GFR NON-AFRICAN AMERICAN: 48 ML/MIN/1.73M2
GLUCOSE BLD-MCNC: 191 MG/DL (ref 70–99)
GLUCOSE BLD-MCNC: 196 MG/DL (ref 70–99)
GLUCOSE BLD-MCNC: 232 MG/DL (ref 70–99)
INR BLD: 1.8 INDEX
LV EF: 45 %
LVEF MODALITY: NORMAL
Lab: NORMAL
MAGNESIUM: 1.7 MG/DL (ref 1.8–2.4)
POTASSIUM SERPL-SCNC: 3.6 MMOL/L (ref 3.5–5.1)
PROCALCITONIN: 0.06
PROTHROMBIN TIME: 22.4 SECONDS (ref 11.7–14.5)
SODIUM BLD-SCNC: 137 MMOL/L (ref 135–145)
SPECIMEN: NORMAL

## 2022-06-27 PROCEDURE — 93306 TTE W/DOPPLER COMPLETE: CPT

## 2022-06-27 PROCEDURE — 85610 PROTHROMBIN TIME: CPT

## 2022-06-27 PROCEDURE — 2500000003 HC RX 250 WO HCPCS

## 2022-06-27 PROCEDURE — 80048 BASIC METABOLIC PNL TOTAL CA: CPT

## 2022-06-27 PROCEDURE — 2500000003 HC RX 250 WO HCPCS: Performed by: NURSE PRACTITIONER

## 2022-06-27 PROCEDURE — 84145 PROCALCITONIN (PCT): CPT

## 2022-06-27 PROCEDURE — 83735 ASSAY OF MAGNESIUM: CPT

## 2022-06-27 PROCEDURE — 6360000002 HC RX W HCPCS: Performed by: NURSE PRACTITIONER

## 2022-06-27 PROCEDURE — 6370000000 HC RX 637 (ALT 250 FOR IP): Performed by: NURSE PRACTITIONER

## 2022-06-27 PROCEDURE — 82962 GLUCOSE BLOOD TEST: CPT

## 2022-06-27 PROCEDURE — 94640 AIRWAY INHALATION TREATMENT: CPT

## 2022-06-27 PROCEDURE — 2580000003 HC RX 258: Performed by: NURSE PRACTITIONER

## 2022-06-27 PROCEDURE — 36415 COLL VENOUS BLD VENIPUNCTURE: CPT

## 2022-06-27 RX ORDER — LANOLIN ALCOHOL/MO/W.PET/CERES
400 CREAM (GRAM) TOPICAL DAILY
Qty: 30 TABLET | Refills: 0 | Status: SHIPPED | OUTPATIENT
Start: 2022-06-28

## 2022-06-27 RX ORDER — FUROSEMIDE 40 MG/1
40 TABLET ORAL 2 TIMES DAILY
Qty: 60 TABLET | Refills: 0 | Status: SHIPPED | OUTPATIENT
Start: 2022-06-27

## 2022-06-27 RX ORDER — LANOLIN ALCOHOL/MO/W.PET/CERES
400 CREAM (GRAM) TOPICAL DAILY
Status: DISCONTINUED | OUTPATIENT
Start: 2022-06-27 | End: 2022-06-27 | Stop reason: HOSPADM

## 2022-06-27 RX ORDER — METOPROLOL TARTRATE 5 MG/5ML
5 INJECTION INTRAVENOUS ONCE
Status: COMPLETED | OUTPATIENT
Start: 2022-06-27 | End: 2022-06-27

## 2022-06-27 RX ORDER — METOPROLOL TARTRATE 5 MG/5ML
INJECTION INTRAVENOUS
Status: COMPLETED
Start: 2022-06-27 | End: 2022-06-27

## 2022-06-27 RX ORDER — FUROSEMIDE 40 MG/1
40 TABLET ORAL 2 TIMES DAILY
Status: DISCONTINUED | OUTPATIENT
Start: 2022-06-27 | End: 2022-06-27 | Stop reason: HOSPADM

## 2022-06-27 RX ORDER — SPIRONOLACTONE 25 MG/1
100 TABLET ORAL 2 TIMES DAILY
Qty: 60 TABLET | Refills: 0
Start: 2022-06-27

## 2022-06-27 RX ORDER — WARFARIN SODIUM 4 MG/1
4 TABLET ORAL DAILY
Status: DISCONTINUED | OUTPATIENT
Start: 2022-06-27 | End: 2022-06-27 | Stop reason: HOSPADM

## 2022-06-27 RX ADMIN — INSULIN LISPRO 2 UNITS: 100 INJECTION, SOLUTION INTRAVENOUS; SUBCUTANEOUS at 08:16

## 2022-06-27 RX ADMIN — SOTALOL HYDROCHLORIDE 40 MG: 80 TABLET ORAL at 08:14

## 2022-06-27 RX ADMIN — METOPROLOL TARTRATE 5 MG: 1 INJECTION, SOLUTION INTRAVENOUS at 04:40

## 2022-06-27 RX ADMIN — FUROSEMIDE 40 MG: 10 INJECTION, SOLUTION INTRAMUSCULAR; INTRAVENOUS at 08:14

## 2022-06-27 RX ADMIN — ALLOPURINOL 100 MG: 100 TABLET ORAL at 08:14

## 2022-06-27 RX ADMIN — BENZONATATE 100 MG: 100 CAPSULE ORAL at 00:39

## 2022-06-27 RX ADMIN — DOXYCYCLINE 100 MG: 100 INJECTION, POWDER, LYOPHILIZED, FOR SOLUTION INTRAVENOUS at 02:08

## 2022-06-27 RX ADMIN — SPIRONOLACTONE 100 MG: 50 TABLET ORAL at 08:14

## 2022-06-27 RX ADMIN — IPRATROPIUM BROMIDE AND ALBUTEROL SULFATE 1 AMPULE: 2.5; .5 SOLUTION RESPIRATORY (INHALATION) at 07:20

## 2022-06-27 RX ADMIN — SODIUM CHLORIDE, PRESERVATIVE FREE 10 ML: 5 INJECTION INTRAVENOUS at 08:16

## 2022-06-27 RX ADMIN — LACTULOSE 10 G: 10 SOLUTION ORAL at 08:16

## 2022-06-27 RX ADMIN — IPRATROPIUM BROMIDE AND ALBUTEROL SULFATE 1 AMPULE: 2.5; .5 SOLUTION RESPIRATORY (INHALATION) at 00:47

## 2022-06-27 RX ADMIN — LEVOTHYROXINE SODIUM 125 MCG: 0.12 TABLET ORAL at 06:27

## 2022-06-27 RX ADMIN — INSULIN LISPRO 4 UNITS: 100 INJECTION, SOLUTION INTRAVENOUS; SUBCUTANEOUS at 12:08

## 2022-06-27 RX ADMIN — BUDESONIDE AND FORMOTEROL FUMARATE DIHYDRATE 2 PUFF: 80; 4.5 AEROSOL RESPIRATORY (INHALATION) at 07:19

## 2022-06-27 RX ADMIN — FLUOXETINE 20 MG: 20 CAPSULE ORAL at 08:15

## 2022-06-27 RX ADMIN — LEVETIRACETAM 750 MG: 500 TABLET, FILM COATED ORAL at 08:15

## 2022-06-27 RX ADMIN — IPRATROPIUM BROMIDE AND ALBUTEROL SULFATE 1 AMPULE: 2.5; .5 SOLUTION RESPIRATORY (INHALATION) at 10:37

## 2022-06-27 RX ADMIN — LORAZEPAM 1 MG: 1 TABLET ORAL at 02:06

## 2022-06-27 RX ADMIN — METOPROLOL TARTRATE 5 MG: 5 INJECTION INTRAVENOUS at 04:40

## 2022-06-27 RX ADMIN — RIFAXIMIN 550 MG: 550 TABLET ORAL at 08:15

## 2022-06-27 RX ADMIN — Medication 400 MG: at 08:15

## 2022-06-27 NOTE — PROGRESS NOTES
Discharge instructions reviewed at the bedside and prescriptions gone over as well. Patient understands to not take her metoprolol and to continue taking her Sotalol for her blood pressure. All medications reviewed and questions answered. IV removed at bedside and clean dressing applied. Telemetry box removed and patient was assisted with getting dressed. Patient is calling her son for a ride home. Prescriptions called into the pharmacy.      Patient picked up by son at 47

## 2022-06-27 NOTE — PROGRESS NOTES
PHARMACY ANTICOAGULATION MONITORING SERVICE    Bárbara Thakkar is a 69 y.o. female on warfarin therapy for CHF. Pharmacy consulted by Cody WU for monitoring and adjustment of treatment. Indication for anticoagulation: CHF  INR goal: 2-3  Warfarin dose prior to admission: 2.5mg every day except 5mg on Tue and Thurs. Pertinent Laboratory Values   Recent Labs     06/25/22  0800 06/25/22  1155 06/26/22  0600 06/26/22  0600 06/27/22  0540   INR  --    < > 1.99   < > 1.80   HGB 14.4   < > 14.1  --   --    HCT 44.0   < > 42.0  --   --      --  146  --   --     < > = values in this interval not displayed. Assessment/Plan:   Drug Interactions: Tylenol, allopurinol, levothyroxine, Prozac and doxycycline.  INR 2-3   INR has been trending down. 6/25 = 2.45, 6/26 = 1.99 and 6/27 = 1.8. Patient was receiving 22.5mg per week or 3.2mg per day.  Plan to increase dose to 4mg per day and monitor INR. [address] [place] [address] will continue to monitor and adjust warfarin therapy as indicated    Thank you for the consult.   [facility] [address], [facility]  6/27/2022 7:45 AM

## 2022-06-27 NOTE — CARE COORDINATION
CM met with the patient to initiate discharge planning. Patient lives at home alone, has insurance with Rx coverage & PCP, stated that she is independent with ADL's, and still drives. Patient stated that she has a cane and walker available at home but does not require their use. Patient does not require home oxygen but does use an inhaler and also has a nebulizer machine. Patient reports that she has family that live close by and are able to assist as needed. Patient plans to return home upon discharge and is unable to identify any needs at this time. CM available if needs arise.

## 2022-06-27 NOTE — DISCHARGE SUMMARY
V2.0  Discharge Summary    Name:  Jelena Enamorado /Age/Sex: 1945 (46 y.o. female)   Admit Date: 2022  Discharge Date: 22    MRN & CSN:  [id number] & [id number] Encounter Date and Time 22 12:47 PM EDT    Attending:  Juliet Noble MD Discharging Provider: TORI Hernandez NP       Hospital Course:     Brief HPI:     Brief Problem Based Course:   Jelena Enamorado is a 69 y.o. female who presents with Acute on chronic diastolic CHF (congestive heart failure) ([facility] [address]     1. Acute on chronic diastolic CHF, continue with IV Lasix, last echocardiogram was in 2018, did show an ejection fraction of 50% moderate left ventricular hypertrophy and grade 2 diastolic dysfunction at that time. ECHO 2022   Left ventricular systolic function is normal.    Ejection fraction is visually estimated at 40- 50%. [address] left ventricular hypertrophy.    S/p mitral valve annuloplasty (#28 CG [employer] ring 17).  Moderate aortic regurgitation; PHT: 469 msec.    No evidence of any pericardial effusion.    Technically difficult study, due to body habitus.    Cannot determine diastolic function due to arrhythmia.    Left ventricle size is normal.       2. Pneumonia, doubtful was most likely CHF urine Legionella and urine strep both negative. Procalcitonin negative, white count   7.5 and 9.2 was not elevated during this admission. Patient has had no fever during admission, no clinical symptoms of pneumonia     3. History atrial fibrillation, patient does have pacemaker in place at this current time she is in normal sinus rhythm continue current home medications she is on Coumadin continue at current home dose, continue sotalol. DC metoprolol. 4. Anxiety, continue current dose of Ativan. 5. History COPD, not in acute exacerbation, continue current inhalers  6.   Diabetes mellitus type 2, will restart current home medications by mouth. 7.   Hypertension, continue current home medications  8.  Hyperlipidemia, continue statin  9.  Liver cirrhosis nonalcoholic, continue lactulose, rifaximin, and Lasix  10.  Hypothyroidism, continue Synthroid  11.  Seizures, continue Keppra  12.  Hepatic encephalopathy, continue lactulose and rifaximin          The patient expressed appropriate understanding of, and agreement with the discharge recommendations, medications, and plan. Consults this admission:  IP CONSULT TO HOSPITALIST  IP CONSULT TO PHARMACY    Discharge Diagnosis:   Acute on chronic diastolic CHF (congestive heart failure) [facility])        Discharge Instruction:   Follow up appointments:   Primary care physician: Can Price MD within 2 weeks  Diet: low fat, low cholesterol diet and Low-sodium   Activity: activity as tolerated  Disposition: Discharged to:   [x]Home, []C, []SNF, []Acute Rehab, []Hospice   Condition on discharge: Stable  Labs and Tests to be Followed up as an outpatient by PCP or Specialist: Follow-up with Dr. Rodri Sequeira patient's cardiologist about echocardiogram results also patient's Lasix has been increased to 40 mg by mouth twice daily. Metoprolol discontinued as patient is on Sotalol. Follow-up with PCP for routine care    Discharge Medications:        Medication List      START taking these medications    magnesium oxide 400 (240 Mg) MG tablet  Commonly known as: MAG-OX  Take 1 tablet by mouth daily  Start taking on: June 28, 2022        CHANGE how you take these medications    furosemide 40 MG tablet  Commonly known as: LASIX  Take 1 tablet by mouth 2 times daily  What changed: when to take this     metFORMIN 1000 MG tablet  Commonly known as: GLUCOPHAGE  Take 0.5 tablets by mouth daily (with breakfast)  What changed: See the new instructions.      spironolactone 25 MG tablet  Commonly known as: ALDACTONE  Take 4 tablets by mouth 2 times daily  What changed:   · how much to take  · when to take this     warfarin 2.5 MG tablet  Commonly known as: COUMADIN  Take one tablet in the evening every day  Please check INR tomorrow at 1/16/2018 with  office and dose of coumadin may need to be adjusted  What changed: additional instructions        CONTINUE taking these medications    albuterol sulfate  (90 Base) MCG/ACT inhaler  Commonly known as: PROVENTIL;VENTOLIN;PROAIR     allopurinol 100 MG tablet  Commonly known as: ZYLOPRIM     atorvastatin 20 MG tablet  Commonly known as: LIPITOR  Take 1 tablet by mouth nightly     budesonide-formoterol 160-4.5 MCG/ACT Aero  Commonly known as: Symbicort  Inhale 2 puffs into the lungs 2 times daily     FLUoxetine 20 MG capsule  Commonly known as: PROZAC  Take 1 capsule by mouth daily     glimepiride 2 MG tablet  Commonly known as: AMARYL     ipratropium-albuterol 0.5-2.5 (3) MG/3ML Soln nebulizer solution  Commonly known as: DUONEB     lactulose 10 GM/15ML solution  Commonly known as: CHRONULAC     levETIRAcetam 750 MG tablet  Commonly known as: KEPPRA     LORazepam 1 MG tablet  Commonly known as: ATIVAN     potassium chloride 10 MEQ extended release capsule  Commonly known as: MICRO-K     sotalol 80 MG tablet  Commonly known as: BETAPACE  Take 0.5 tablets by mouth 2 times daily     Synthroid 137 MCG tablet  Generic drug: levothyroxine     Tradjenta 5 MG tablet  Generic drug: linagliptin     Xifaxan 550 MG tablet  Generic drug: rifAXIMin        STOP taking these medications    aspirin 81 MG EC tablet     benzonatate 100 MG capsule  Commonly known as: TESSALON     methylPREDNISolone 4 MG tablet  Commonly known as: MEDROL DOSEPACK     metoprolol tartrate 25 MG tablet  Commonly known as: LOPRESSOR     pregabalin 75 MG capsule  Commonly known as: LYRICA           Where to Get Your Medications      You can get these medications from any pharmacy    Bring a paper prescription for each of these medications  · furosemide 40 MG tablet  · magnesium oxide 400 (240 Mg) MG tablet     Information about where to get these medications is not yet available    Ask your nurse or doctor about these medications  · metFORMIN 1000 MG tablet  · spironolactone 25 MG tablet        Objective Findings at Discharge:   /73   Pulse 84   Temp 97 °F (36.1 °C) (Infrared)   Resp 18   Ht 5' 6\" (1.676 m)   Wt 183 lb 4.8 oz (83.1 kg)   LMP 01/08/2002   SpO2 96%   BMI 29.59 kg/m²       Physical Exam:   General: NAD  Eyes: EOMI  ENT: neck supple  Cardiovascular: Regular rate and rhythm  Respiratory: Clear to auscultation bilaterally throughout all lung fields patient is now back on her baseline of room air since yesterday. Gastrointestinal: Soft, non tender, nondistended bowel sounds present all 4 quadrants  Genitourinary: no suprapubic tenderness, no CVA tenderness. Musculoskeletal: No edema  Skin: warm, dry  Neuro: Alert. Psych: Mood appropriate. Labs and Imaging   XR CHEST (2 VW)    Result Date: 6/26/2022  EXAMINATION: TWO XRAY VIEWS OF THE CHEST 6/26/2022 8:28 am COMPARISON: Chest radiograph 06/25/2022, chest CTA 03/23/2022 HISTORY: ORDERING SYSTEM PROVIDED HISTORY: re-eval pulm edema TECHNOLOGIST PROVIDED HISTORY: Reason for exam:->re-eval pulm edema FINDINGS: Unchanged left subclavian dual chamber pacemaker. Changes of mitral valve replacement coronary artery bypass grafting. Overlying heart monitor leads and tubing. Decreased bilateral perihilar and basilar airspace opacities. Unchanged scarring in lateral base of the lingula better demonstrated on CT. Diffuse interstitial prominence with indistinct pulmonary vasculature but no definite interlobular septal thickening. No findings of pneumothorax or pleural effusion. Prominence of the pulmonary trunk that can be seen with pulmonary hypertension. Suboptimally seen aneurysmal dilation the thoracic aorta near the junction of the arch and descending aorta. Mild cardiomegaly. Prominent epicardial fat bilaterally.      Pulmonary vascular congestion with decreased central predominant edema, suggesting improved congestive heart failure given mild cardiomegaly and prior heart surgery. Pneumonia could appear similar, and basilar atelectasis may be present. XR CHEST PORTABLE    Result Date: 6/25/2022  EXAMINATION: ONE XRAY VIEW OF THE CHEST 6/25/2022 8:14 am COMPARISON: February 4, 2021 HISTORY: ORDERING SYSTEM PROVIDED HISTORY: chest pain TECHNOLOGIST PROVIDED HISTORY: Reason for exam:->chest pain Reason for Exam: sob, hx COPD Additional signs and symptoms: sob, hx COPD Relevant Medical/Surgical History: sob, hx COPD FINDINGS: The cardiomediastinal silhouette is moderately enlarged. Stable post sternotomy changes are present. Pacer leads are unchanged in position. Vascular congestion noted with mild pulmonary edema and small bilateral pleural effusions. More focal area of airspace disease involving the right lower lobe. No evidence of pneumothorax. Findings are most consistent with congestive heart failure. More prominent focal airspace disease of the right lower lobe the could indicate asymmetric pulmonary edema although pneumonia would have a similar appearance. Radiographic follow-up is recommended to ensure resolution of the above findings.        CBC:   Recent Labs     06/25/22  0800 06/26/22  0600   WBC 9.2 7.5   HGB 14.4 14.1    146     BMP:    Recent Labs     06/25/22  0800 06/26/22  0600 06/27/22  0540    137 137   K 4.0 3.8 3.6    99 99   CO2 28 29 26   BUN 12 12 14   CREATININE 1.0 1.1 1.1   GLUCOSE 144* 143* 191*     Hepatic:   Recent Labs     06/25/22  0800   AST 24   ALT 20   BILITOT 1.1*   ALKPHOS 78     Lipids:   Lab Results   Component Value Date    CHOL 102 06/26/2022    HDL 40 06/26/2022    TRIG 171 06/26/2022     Hemoglobin A1C:   Lab Results   Component Value Date    LABA1C 7.1 06/25/2022     Troponin:   Lab Results   Component Value Date    TROPONINT <0.010 06/25/2022    TROPONINT <0.010 04/12/2019    TROPONINT <0.010 06/03/2018     UA:  Lab Results   Component Value Date NITRU NEGATIVE 06/25/2022    COLORU YELLOW 06/25/2022    WBCUA 7 06/25/2022    RBCUA 2 06/25/2022    MUCUS 1+ 12/25/2017    TRICHOMONAS NONE SEEN 01/28/2018    BACTERIA OCCASIONAL 06/25/2022    CLARITYU CLEAR 06/25/2022    SPECGRAV 1.015 06/25/2022    LEUKOCYTESUR NEGATIVE 06/25/2022    UROBILINOGEN 0.2 06/25/2022    BILIRUBINUR NEGATIVE 06/25/2022    BLOODU NEGATIVE 06/25/2022    KETUA NEGATIVE 06/25/2022       Time Spent Discharging patient 35 minutes. Discharge plan discussed with my supervising physician Dr. Rhiannon Lugo.     Electronically signed by TORI Barlow NP on 6/27/2022 at 2:19 PM

## 2022-06-27 NOTE — PLAN OF CARE
Problem: Discharge Planning  Goal: Discharge to home or other facility with appropriate resources  6/27/2022 1428 by Luis St RN  Outcome: Adequate for Discharge  6/27/2022 0957 by Naheed Woods RN  Outcome: Progressing     Problem: Pain  Goal: Verbalizes/displays adequate comfort level or baseline comfort level  6/27/2022 1428 by Luis St RN  Outcome: Adequate for Discharge  6/27/2022 0957 by Naheed [place] RN  Outcome: Progressing     Problem: ABCDS Injury Assessment  Goal: Absence of physical injury  6/27/2022 1428 by Luis St RN  Outcome: Adequate for Discharge  6/27/2022 0957 by Naheed [place] RN  Outcome: Progressing     Problem: Safety - Adult  Goal: Free from fall injury  6/27/2022 1428 by Luis St RN  Outcome: Adequate for Discharge  6/27/2022 0957 by Naheed Woods RN  Outcome: Progressing

## 2022-06-30 LAB
CULTURE: NORMAL
CULTURE: NORMAL
Lab: NORMAL
Lab: NORMAL
SPECIMEN: NORMAL
SPECIMEN: NORMAL

## 2022-11-30 ENCOUNTER — HOSPITAL ENCOUNTER (EMERGENCY)
Age: 77
Discharge: HOME | End: 2022-11-30
Attending: EMERGENCY MEDICINE
Payer: MEDICARE

## 2022-11-30 ENCOUNTER — APPOINTMENT (OUTPATIENT)
Dept: GENERAL RADIOLOGY | Age: 77
End: 2022-11-30
Payer: MEDICARE

## 2022-11-30 VITALS
OXYGEN SATURATION: 89 % | BODY MASS INDEX: 23.3 KG/M2 | HEIGHT: 66 IN | HEART RATE: 132 BPM | DIASTOLIC BLOOD PRESSURE: 75 MMHG | RESPIRATION RATE: 19 BRPM | WEIGHT: 145 LBS | SYSTOLIC BLOOD PRESSURE: 111 MMHG | TEMPERATURE: 98.4 F

## 2022-11-30 DIAGNOSIS — J18.9 PNEUMONIA DUE TO INFECTIOUS ORGANISM, UNSPECIFIED LATERALITY, UNSPECIFIED PART OF LUNG: ICD-10-CM

## 2022-11-30 DIAGNOSIS — J44.1 COPD EXACERBATION (HCC): Primary | ICD-10-CM

## 2022-11-30 LAB
ALBUMIN SERPL-MCNC: 3.5 GM/DL (ref 3.4–5)
ALP BLD-CCNC: 111 IU/L (ref 40–129)
ALT SERPL-CCNC: 12 U/L (ref 10–40)
ANION GAP SERPL CALCULATED.3IONS-SCNC: 13 MMOL/L (ref 4–16)
AST SERPL-CCNC: 27 IU/L (ref 15–37)
BASOPHILS ABSOLUTE: 0 K/CU MM
BASOPHILS RELATIVE PERCENT: 0.5 % (ref 0–1)
BILIRUB SERPL-MCNC: 1.1 MG/DL (ref 0–1)
BUN BLDV-MCNC: 9 MG/DL (ref 6–23)
CALCIUM SERPL-MCNC: 9.2 MG/DL (ref 8.3–10.6)
CHLORIDE BLD-SCNC: 99 MMOL/L (ref 99–110)
CO2: 25 MMOL/L (ref 21–32)
CREAT SERPL-MCNC: 0.8 MG/DL (ref 0.6–1.1)
DIFFERENTIAL TYPE: ABNORMAL
EKG DIAGNOSIS: NORMAL
EKG Q-T INTERVAL: 384 MS
EKG QRS DURATION: 86 MS
EKG QTC CALCULATION (BAZETT): 549 MS
EKG R AXIS: 61 DEGREES
EKG T AXIS: -63 DEGREES
EKG VENTRICULAR RATE: 123 BPM
EOSINOPHILS ABSOLUTE: 0.1 K/CU MM
EOSINOPHILS RELATIVE PERCENT: 1.3 % (ref 0–3)
GFR SERPL CREATININE-BSD FRML MDRD: >60 ML/MIN/1.73M2
GLUCOSE BLD-MCNC: 192 MG/DL (ref 70–99)
HCT VFR BLD CALC: 47.8 % (ref 37–47)
HEMOGLOBIN: 15.6 GM/DL (ref 12.5–16)
IMMATURE NEUTROPHIL %: 0.6 % (ref 0–0.43)
LYMPHOCYTES ABSOLUTE: 0.8 K/CU MM
LYMPHOCYTES RELATIVE PERCENT: 9.5 % (ref 24–44)
MCH RBC QN AUTO: 32.2 PG (ref 27–31)
MCHC RBC AUTO-ENTMCNC: 32.6 % (ref 32–36)
MCV RBC AUTO: 98.6 FL (ref 78–100)
MONOCYTES ABSOLUTE: 0.5 K/CU MM
MONOCYTES RELATIVE PERCENT: 6.3 % (ref 0–4)
PDW BLD-RTO: 14.6 % (ref 11.7–14.9)
PLATELET # BLD: 155 K/CU MM (ref 140–440)
PMV BLD AUTO: 10.3 FL (ref 7.5–11.1)
POTASSIUM SERPL-SCNC: 3.7 MMOL/L (ref 3.5–5.1)
RAPID INFLUENZA  B AGN: NEGATIVE
RAPID INFLUENZA A AGN: NEGATIVE
RBC # BLD: 4.85 M/CU MM (ref 4.2–5.4)
SEGMENTED NEUTROPHILS ABSOLUTE COUNT: 6.8 K/CU MM
SEGMENTED NEUTROPHILS RELATIVE PERCENT: 81.8 % (ref 36–66)
SODIUM BLD-SCNC: 137 MMOL/L (ref 135–145)
TOTAL IMMATURE NEUTOROPHIL: 0.05 K/CU MM
TOTAL PROTEIN: 6.4 GM/DL (ref 6.4–8.2)
TROPONIN T: <0.01 NG/ML
WBC # BLD: 8.3 K/CU MM (ref 4–10.5)

## 2022-11-30 PROCEDURE — 93010 ELECTROCARDIOGRAM REPORT: CPT | Performed by: INTERNAL MEDICINE

## 2022-11-30 PROCEDURE — 84484 ASSAY OF TROPONIN QUANT: CPT

## 2022-11-30 PROCEDURE — 99285 EMERGENCY DEPT VISIT HI MDM: CPT | Performed by: EMERGENCY MEDICINE

## 2022-11-30 PROCEDURE — 2700000000 HC OXYGEN THERAPY PER DAY

## 2022-11-30 PROCEDURE — 6370000000 HC RX 637 (ALT 250 FOR IP): Performed by: EMERGENCY MEDICINE

## 2022-11-30 PROCEDURE — 85025 COMPLETE CBC W/AUTO DIFF WBC: CPT

## 2022-11-30 PROCEDURE — 94640 AIRWAY INHALATION TREATMENT: CPT

## 2022-11-30 PROCEDURE — 80053 COMPREHEN METABOLIC PANEL: CPT

## 2022-11-30 PROCEDURE — 87804 INFLUENZA ASSAY W/OPTIC: CPT

## 2022-11-30 PROCEDURE — 93005 ELECTROCARDIOGRAM TRACING: CPT | Performed by: EMERGENCY MEDICINE

## 2022-11-30 PROCEDURE — 71046 X-RAY EXAM CHEST 2 VIEWS: CPT

## 2022-11-30 PROCEDURE — 94761 N-INVAS EAR/PLS OXIMETRY MLT: CPT

## 2022-11-30 RX ORDER — PREDNISONE 20 MG/1
40 TABLET ORAL ONCE
Status: COMPLETED | OUTPATIENT
Start: 2022-11-30 | End: 2022-11-30

## 2022-11-30 RX ORDER — DOXYCYCLINE HYCLATE 100 MG
100 TABLET ORAL 2 TIMES DAILY
Qty: 20 TABLET | Refills: 0 | Status: SHIPPED | OUTPATIENT
Start: 2022-11-30 | End: 2022-12-10

## 2022-11-30 RX ORDER — AZITHROMYCIN 250 MG/1
250 TABLET, FILM COATED ORAL DAILY
Qty: 6 TABLET | Refills: 0 | Status: SHIPPED | OUTPATIENT
Start: 2022-11-30 | End: 2022-12-06

## 2022-11-30 RX ORDER — PREDNISONE 20 MG/1
40 TABLET ORAL DAILY
Qty: 8 TABLET | Refills: 0 | Status: SHIPPED | OUTPATIENT
Start: 2022-11-30 | End: 2022-12-04

## 2022-11-30 RX ORDER — IPRATROPIUM BROMIDE AND ALBUTEROL SULFATE 2.5; .5 MG/3ML; MG/3ML
3 SOLUTION RESPIRATORY (INHALATION) ONCE
Status: COMPLETED | OUTPATIENT
Start: 2022-11-30 | End: 2022-11-30

## 2022-11-30 RX ADMIN — IPRATROPIUM BROMIDE AND ALBUTEROL SULFATE 3 ML: .5; 3 SOLUTION RESPIRATORY (INHALATION) at 09:51

## 2022-11-30 RX ADMIN — PREDNISONE 40 MG: 20 TABLET ORAL at 10:13

## 2022-11-30 NOTE — ED NOTES
Pt tolerated walking well O2 dropped from 97% to 89% on RA with increased HR of 132.      Heidy Trejo RN  11/30/22 [id number]

## 2022-11-30 NOTE — ED NOTES
Bedside commode placed in room for pt is sob and convenience, no needs at this time, family at bedside.       Jesus Brown RN  11/30/22 [id number]

## 2022-11-30 NOTE — DISCHARGE INSTRUCTIONS
Do your breathing treatments every 4 hours today and tomorrow. After that you can spread it out to every 6 hours. After that you can spread it up to every 8 hours.   You come back if something changes or does not feel right at home

## 2022-11-30 NOTE — ED TRIAGE NOTES
Pt states she takes home breathing treatments and rescue inhalers that help but relief does not last long.

## 2022-11-30 NOTE — ED NOTES
Pt was wheeled from the ed with family at bedside, pt was educated on home care and medications. No questions asked.       Johann Dee RN  11/30/22 [id number]

## 2022-11-30 NOTE — ED NOTES
Pt placed on oxygen for o@ dropped down to 88% provider notified.       Heidy Trejo RN  11/30/22 9615

## 2022-11-30 NOTE — ED PROVIDER NOTES
Triage Chief Complaint:   Cough and Shortness of Breath      [place]:  Miriam Thibodeaux is a 69 y.o. female that presents patient presents with her son today after experiencing a cough for the past 3 weeks. Initially started she was placed on a weeks of antibiotics and steroids with improvement of her cough but it started to progressively worsen over the last 2 weeks. She reports over the last several days, the cough has intensified. She has had intermittent purulent production. Prior history of pneumonia and is somewhat feels similar. She has been using her albuterol inhalers at home and this does feel like it helps but it is transient. She denies having any associated chest pain. She does have dyspnea on exertion and difficulty doing her ADLs. She does not usually wear oxygen but has in the past.  She reports prior history of pleural effusions after her CABG and this does not feel the same. Has been compliant with her Eliquis for her atrial fibrillation. Denies any leg pain or leg swelling. Has not had any fevers or chills. ROS:  At least 10 systems reviewed and otherwise negative except as in the [address]     Past Medical History:   Diagnosis Date    Anxiety     per old chart hx of panic attacks    Atrial fibrillation (HCC)     Atrial fibrillation with normal ventricular rate ([facility] 11/2015    CAD (coronary artery disease)     follows with Dr Hipolito Carcamo, bilateral     COPD (chronic obstructive pulmonary disease) ([facility]     follows with Dr Marti Garcia    Depression     Diabetes mellitus [facility])     dx 1's per old chart    Hyperlipidemia     Hypertension     Liver cirrhosis ([facility]     Pleural effusion     scheduled for thoracentesis 9/11/2017  has had fluid drained off lung 4 times since CABG    Seizure [facility])     \"had a diabetic seizure 16 hrs ago- from low sugar\"    Thyroid disease     Wears dentures     full  upper plate    Wears glasses      Past Surgical History:   Procedure Laterality Date    ABDOMEN SURGERY      per old chart pt had exp. laparotomy and appy done \"the appendix was wrapped around the colon\"    [address]  2017    Dr. Arnold Munson. Diagnostic only, no intervention done. [facility]    COLONOSCOPY  2010    CORONARY ARTERY BYPASS GRAFT  2017    [place] to [place]. Done per Dr. Shawn Matthews with mitral valve repair     MITRAL VALVE SURGERY  2017    MV repair with ring     OTHER SURGICAL HISTORY      per old chart pt had exc. fistula anal canal with Dr Kathy Lama done 2010    PACEMAKER PLACEMENT  2018    THORACOSCOPY Left 2017    with decortication     TUBAL LIGATION  age 43's     Family History   Problem Relation Age of Onset    Alcohol Abuse Father     Breast Cancer Sister     Alcohol Abuse Sister     Alcohol Abuse Brother     Cancer Brother         prostate    Alcohol Abuse Brother     Alcohol Abuse Brother     Alcohol Abuse Brother     Alcohol Abuse Brother      Social History     Socioeconomic History    Marital status:       Spouse name: Not on file    Number of children: Not on file    Years of education: Not on file    Highest education level: Not on file   Occupational History    Not on file   Tobacco Use    Smoking status: Former     Packs/day: 0.50     Years: 17.00     Pack years: 8.50     Types: Cigarettes     Quit date: 2001     Years since quittin.8    Smokeless tobacco: Never   Vaping Use    Vaping Use: Former   Substance and Sexual Activity    Alcohol use: No    Drug use: No    Sexual activity: Not on file   Other Topics Concern    Not on file   Social History Narrative    Not on file     Social Determinants of Health     Financial Resource Strain: Not on file   Food Insecurity: Not on file   Transportation Needs: Not on file   Physical Activity: Not on file   Stress: Not on file   Social Connections: Not on file   Intimate Partner Violence: Not on file   Housing Stability: Not on file     No current facility-administered medications for this encounter.      Current Outpatient Medications   Medication Sig Dispense Refill    predniSONE (DELTASONE) 20 MG tablet Take 2 tablets by mouth daily for 4 days 8 tablet 0    doxycycline hyclate (VIBRA-TABS) 100 MG tablet Take 1 tablet by mouth 2 times daily for 10 days 20 tablet 0    azithromycin (ZITHROMAX) 250 MG tablet Take 1 tablet by mouth daily for 6 days Take 500mg the first day, and then 250mg the remaining four days 6 tablet 0    metFORMIN (GLUCOPHAGE) 1000 MG tablet Take 0.5 tablets by mouth daily (with breakfast) 60 tablet 4    magnesium oxide (MAG-OX) 400 (240 Mg) MG tablet Take 1 tablet by mouth daily 30 tablet 0    spironolactone (ALDACTONE) 25 MG tablet Take 4 tablets by mouth 2 times daily 60 tablet 0    furosemide (LASIX) 40 MG tablet Take 1 tablet by mouth 2 times daily 60 tablet 0    albuterol sulfate HFA (PROVENTIL;VENTOLIN;PROAIR) 108 (90 Base) MCG/ACT inhaler       allopurinol (ZYLOPRIM) 100 MG tablet Take 100 mg by mouth daily      lactulose (CHRONULAC) 10 GM/15ML solution Take 15 mLs by mouth daily      potassium chloride (MICRO-K) 10 MEQ extended release capsule Take 10 mEq by mouth daily      rifaximin (XIFAXAN) 550 MG tablet Take 1 tablet by mouth 2 times daily      atorvastatin (LIPITOR) 20 MG tablet Take 1 tablet by mouth nightly 30 tablet 3    FLUoxetine (PROZAC) 20 MG capsule Take 1 capsule by mouth daily 30 capsule 0    warfarin (COUMADIN) 2.5 MG tablet Take one tablet in the evening every day  Please check INR tomorrow at 1/16/2018 with  office and dose of coumadin may need to be adjusted (Patient taking differently: 2.5 mg daily except on Tuesday and Thursdays she takes 5 mg) 30 tablet 0    sotalol (BETAPACE) 80 MG tablet Take 0.5 tablets by mouth 2 times daily 60 tablet 3    levETIRAcetam (KEPPRA) 750 MG tablet TK  1 T PO BID  2    glimepiride (AMARYL) 2 MG tablet Take 1 mg by mouth every morning (before breakfast)       ipratropium-albuterol (DUONEB) 0.5-2.5 (3) MG/3ML SOLN nebulizer solution Take 1 vial by nebulization every 4 hours as needed  (Patient not taking: Reported on 6/25/2022)      budesonide-formoterol (SYMBICORT) 160-4.5 MCG/ACT AERO Inhale 2 puffs into the lungs 2 times daily 1 Inhaler 5    linagliptin (TRADJENTA) 5 MG tablet Take 5 mg by mouth daily      LORazepam (ATIVAN) 1 MG tablet Take 1 mg by mouth every 6 hours as needed for Anxiety Up to 4 a day. levothyroxine (SYNTHROID) 137 MCG tablet Take 125 mcg by mouth Daily        Allergies   Allergen Reactions    Ceclor [Cefaclor] Hives    Tape [Adhesive Tape] Rash     Paper tape ok       Nursing Notes Reviewed    Physical Exam:  ED Triage Vitals [11/30/22 0921]   Enc Vitals Group      BP (!) 150/81      Heart Rate 52      Resp 26      Temp 98.4 °F (36.9 °C)      Temp Source Oral      SpO2 92 %      Weight 145 lb (65.8 kg)      Height 5' 6\" (1.676 m)      Head Circumference       Peak Flow       Pain Score       Pain Loc       Pain Edu? Excl. in [address]? GENERAL APPEARANCE: Awake and alert. Cooperative. No acute distress. HEAD: Normocephalic. Atraumatic. EYES: EOM's grossly intact. Sclera anicteric. ENT: Mucous membranes are moist. Tolerates saliva. No trismus. NECK: Supple. No meningismus. Trachea midline. HEART: Irregularly irregular. Radial pulses 2+. LUNGS: Conversational dyspnea, evidence of frequent dry cough noted. Has diminished aeration throughout, some faint wheezing heard in the bases. ABDOMEN: Soft. Non-tender. No guarding or rebound. EXTREMITIES: No acute deformities. No swelling noted  SKIN: Warm and dry. NEUROLOGICAL: No gross facial drooping. Moves all 4 extremities spontaneously. PSYCHIATRIC: Normal mood.     I have reviewed and interpreted all of the currently available lab results from this visit (if applicable):  Results for orders placed or performed during the hospital encounter of 11/30/22   Rapid Flu Swab    Specimen: Nasopharyngeal   Result Value Ref Range    Rapid Influenza A Ag NEGATIVE NEGATIVE    Rapid Influenza B Ag NEGATIVE NEGATIVE   CBC with Auto Differential   Result Value Ref Range    WBC 8.3 4.0 - 10.5 K/CU MM    RBC 4.85 4.2 - 5.4 M/CU MM    Hemoglobin 15.6 12.5 - 16.0 GM/DL    Hematocrit 47.8 (H) 37 - 47 %    MCV 98.6 78 - 100 FL    MCH 32.2 (H) 27 - 31 PG    MCHC 32.6 32.0 - 36.0 %    RDW 14.6 11.7 - 14.9 %    Platelets 202 510 - 004 K/CU MM    MPV 10.3 7.5 - 11.1 FL    Differential Type AUTOMATED DIFFERENTIAL     Segs Relative 81.8 (H) 36 - 66 %    Lymphocytes % 9.5 (L) 24 - 44 %    Monocytes % 6.3 (H) 0 - 4 %    Eosinophils % 1.3 0 - 3 %    Basophils % 0.5 0 - 1 %    Segs Absolute 6.8 K/CU MM    Lymphocytes Absolute 0.8 K/CU MM    Monocytes Absolute 0.5 K/CU MM    Eosinophils Absolute 0.1 K/CU MM    Basophils Absolute 0.0 K/CU MM    Immature Neutrophil % 0.6 (H) 0 - 0.43 %    Total Immature Neutrophil 0.05 K/CU MM   CMP   Result Value Ref Range    Sodium 137 135 - 145 MMOL/L    Potassium 3.7 3.5 - 5.1 MMOL/L    Chloride 99 99 - 110 mMol/L    CO2 25 21 - 32 MMOL/L    BUN 9 6 - 23 MG/DL    Creatinine 0.8 0.6 - 1.1 MG/DL    Est, Glom Filt Rate >60 >60 mL/min/1.73m2    Glucose 192 (H) 70 - 99 MG/DL    Calcium 9.2 8.3 - 10.6 MG/DL    Albumin 3.5 3.4 - 5.0 GM/DL    Total Protein 6.4 6.4 - 8.2 GM/DL    Total Bilirubin 1.1 (H) 0.0 - 1.0 MG/DL    ALT 12 10 - 40 U/L    AST 27 15 - 37 IU/L    Alkaline Phosphatase 111 40 - 129 IU/L    Anion Gap 13 4 - 16   Troponin   Result Value Ref Range    Troponin T <0.010 <0.01 NG/ML   EKG 12 Lead   Result Value Ref Range    Ventricular Rate 123 BPM    QRS Duration 86 ms    Q-T Interval 384 ms    QTc Calculation (Bazett) 549 ms    R Axis 61 degrees    T Axis -63 degrees    Diagnosis       Atrial flutter with variable AV block with premature ventricular or aberrantly conducted complexes  Nonspecific ST and T wave abnormality  Abnormal ECG  When compared with ECG of 25-JUN-2022 08:16,  Atrial flutter has replaced Sinus rhythm  Vent. rate has increased BY  57 BPM  Confirmed by [facility] MD, Juhi Marin ([id number]) on 11/30/2022 5:14:01 PM        Radiographs (if obtained):  [] The following radiograph was interpreted by myself in the absence of a radiologist:  [x] Radiologist's Report Reviewed:  No results found. EKG (if obtained): (All EKG's are interpreted by myself in the absence of a cardiologist)    Patient had an EKG that was done at 959 on November 30. She has evidence of atrial flutter with a rapid heart rate. Heart rate of 123. Nonspecific ST-T wave changes noted, evidence of PVCs. MDM:    When I first evaluated the patient, she was in the midst of what appears to be a bronchospasm. She had a normal heart rate in the 50s that was irregular but has history of atrial fibrillation. After we gave her several breathing treatments her heart rate did go up however she had resolution of her bronchospasm. She felt considerably improved. Her blood work was overall well-appearing. She had a negative troponin. Her flu swab was negative. ED Course as of 12/02/22 0724   Wed Nov 30, 2022   1144 Patient looks considerably improved after duo nebs, breathing treatments, steroids. Her work-up so far is reassuring. Her chest x-ray showed possible early infiltrate and with her ongoing cough for the past week and a half with productive sputum, we will treat this with antibiotics. She did have a oxygen saturation of 88% when she was initially seen. She had frequent coughing at this point that I suspect is secondary to bronchospasm. We did place her on oxygen at this point. She looks much improved with resolution of her bronchospasm. We will ambulate her on a pulse ox to see if she develops any exercise-induced hypoxia. If she does not, I do think she is a good candidate for outpatient antibiotics, and therapy.   Son at bedside agrees and patient is comfortable with this as well. We did discuss antibiotics, steroid use, scheduled albuterol treatments for the next several days. She felt comfortable with this plan and was given instructions to have a low threshold for return. Low suspicion for PE, myocarditis, pericarditis, ACS, sepsis based on today's evaluation. Of note, she was tachycardic upon discharge but suspect this is secondary to her breathing treatments. When she first arrived, she had a normal heart rate and her tachycardia only resulted after she received DuoNeb therapy. [MS]   1153 EKG Interpretation    Interpreted by emergency department physician    Rhythm: atrial fibrillation - rapid  Rate: 120-130  Axis: normal  Ectopy: pvcs  Conduction: normal  ST Segments: nonspecific ST-T wave changes comparable to previous. 6/25/2021       Clinical Impression: atrial fibrillation w rvr    Lindsey Hannon MD     [MS]   [phone] Patient was ambulated here on pulse ox. She was able to complete a very large lap in the emergency department. She had no increased work of breathing but her oxygen saturation did go down to 89%. Her heart rate elevated to 132. When she sat down, her heart rate went back down to the low 100s. I did discuss admission versus discharge with both the patient and her son at bedside. Patient would prefer to be discharged at this Time with breathing treatments, steroids, antibiotics. I did encourage her to return if symptoms change or worsen and she verbalized understanding. She has capacity to make this decision. She was on Augmentin previously and so I did switch her to doxycycline as well as azithromycin. She does have a PCP with whom she can follow. [MS]      ED Course User Index  [MS] Lindsey Hannon MD       There was a high probability of clinically significant life-threatening deterioration of the patient's condition requiring my urgent intervention.      Total critical care time is 32 minutes    This includes vital sign monitoring, pulse oximetry monitoring, telemetry monitoring, clinical response to the IV medications, reviewing the nursing notes, consultation time, dictation/documentation time, and interpretation of the labwork. (This time excludes time spent performing procedures). Clinical Impression:  1. COPD exacerbation ([facility]    2.  Pneumonia due to infectious organism, unspecified laterality, unspecified part of lung      (Please note that portions of this note may have been completed with a voice recognition program. Efforts were made to edit the dictations but occasionally words are mis-transcribed.)    Electronically signed by Heber Lemos MD on 12/2/2022 at 7:24 AM                     Heber Lemos MD  12/02/22 Mukund Rojas MD  12/02/22 [id number]

## 2023-03-26 ENCOUNTER — HOSPITAL ENCOUNTER (EMERGENCY)
Age: 78
Discharge: HOME | End: 2023-03-26
Attending: EMERGENCY MEDICINE
Payer: MEDICARE

## 2023-03-26 VITALS
TEMPERATURE: 98 F | BODY MASS INDEX: 28.61 KG/M2 | DIASTOLIC BLOOD PRESSURE: 92 MMHG | OXYGEN SATURATION: 93 % | RESPIRATION RATE: 18 BRPM | HEIGHT: 66 IN | WEIGHT: 178 LBS | SYSTOLIC BLOOD PRESSURE: 118 MMHG | HEART RATE: 122 BPM

## 2023-03-26 DIAGNOSIS — J02.0 STREP PHARYNGITIS: Primary | ICD-10-CM

## 2023-03-26 LAB
B PARAP IS1001 DNA NPH QL NAA+NON-PROBE: NOT DETECTED
B PERT.PT PRMT NPH QL NAA+NON-PROBE: NOT DETECTED
C PNEUM DNA NPH QL NAA+NON-PROBE: NOT DETECTED
CULTURE: NORMAL
FLUAV H1 2009 PAN RNA NPH NAA+NON-PROBE: NOT DETECTED
FLUAV H1 RNA NPH QL NAA+NON-PROBE: NOT DETECTED
FLUAV H3 RNA NPH QL NAA+NON-PROBE: NOT DETECTED
FLUAV RNA NPH QL NAA+NON-PROBE: NOT DETECTED
FLUBV RNA NPH QL NAA+NON-PROBE: NOT DETECTED
HADV DNA NPH QL NAA+NON-PROBE: NOT DETECTED
HCOV 229E RNA NPH QL NAA+NON-PROBE: NOT DETECTED
HCOV HKU1 RNA NPH QL NAA+NON-PROBE: NOT DETECTED
HCOV NL63 RNA NPH QL NAA+NON-PROBE: NOT DETECTED
HCOV OC43 RNA NPH QL NAA+NON-PROBE: NOT DETECTED
HMPV RNA NPH QL NAA+NON-PROBE: NOT DETECTED
HPIV1 RNA NPH QL NAA+NON-PROBE: NOT DETECTED
HPIV2 RNA NPH QL NAA+NON-PROBE: NOT DETECTED
HPIV3 RNA NPH QL NAA+NON-PROBE: NOT DETECTED
HPIV4 RNA NPH QL NAA+NON-PROBE: NOT DETECTED
Lab: NORMAL
M PNEUMO DNA NPH QL NAA+NON-PROBE: NOT DETECTED
RSV RNA NPH QL NAA+NON-PROBE: NOT DETECTED
RV+EV RNA NPH QL NAA+NON-PROBE: NOT DETECTED
SARS-COV-2 RNA NPH QL NAA+NON-PROBE: NOT DETECTED
SPECIMEN: NORMAL
STREP A DIRECT SCREEN: POSITIVE

## 2023-03-26 PROCEDURE — 99283 EMERGENCY DEPT VISIT LOW MDM: CPT

## 2023-03-26 PROCEDURE — 87430 STREP A AG IA: CPT

## 2023-03-26 PROCEDURE — 6370000000 HC RX 637 (ALT 250 FOR IP): Performed by: EMERGENCY MEDICINE

## 2023-03-26 PROCEDURE — 6360000002 HC RX W HCPCS: Performed by: EMERGENCY MEDICINE

## 2023-03-26 PROCEDURE — 0202U NFCT DS 22 TRGT SARS-COV-2: CPT

## 2023-03-26 RX ORDER — DEXAMETHASONE 4 MG/1
8 TABLET ORAL ONCE
Status: COMPLETED | OUTPATIENT
Start: 2023-03-26 | End: 2023-03-26

## 2023-03-26 RX ORDER — LIDOCAINE HYDROCHLORIDE 20 MG/ML
15 SOLUTION OROPHARYNGEAL ONCE
Status: COMPLETED | OUTPATIENT
Start: 2023-03-26 | End: 2023-03-26

## 2023-03-26 RX ORDER — AMOXICILLIN 500 MG/1
500 CAPSULE ORAL 3 TIMES DAILY
Qty: 30 CAPSULE | Refills: 0 | Status: SHIPPED | OUTPATIENT
Start: 2023-03-26 | End: 2023-04-05

## 2023-03-26 RX ORDER — DIPHENHYDRAMINE HCL 12.5MG/5ML
12.5 LIQUID (ML) ORAL ONCE
Status: DISCONTINUED | OUTPATIENT
Start: 2023-03-26 | End: 2023-03-26 | Stop reason: CLARIF

## 2023-03-26 RX ORDER — AMOXICILLIN 500 MG/1
500 CAPSULE ORAL ONCE
Status: COMPLETED | OUTPATIENT
Start: 2023-03-26 | End: 2023-03-26

## 2023-03-26 RX ORDER — MAGNESIUM HYDROXIDE/ALUMINUM HYDROXICE/SIMETHICONE 120; 1200; 1200 MG/30ML; MG/30ML; MG/30ML
30 SUSPENSION ORAL ONCE
Status: COMPLETED | OUTPATIENT
Start: 2023-03-26 | End: 2023-03-26

## 2023-03-26 RX ADMIN — AMOXICILLIN 500 MG: 500 CAPSULE ORAL at 15:43

## 2023-03-26 RX ADMIN — LIDOCAINE HYDROCHLORIDE 15 ML: 20 SOLUTION ORAL at 14:53

## 2023-03-26 RX ADMIN — DIPHENHYDRAMINE HYDROCHLORIDE 12.5 MG: 12.5 SOLUTION ORAL at 14:57

## 2023-03-26 RX ADMIN — ALUMINUM HYDROXIDE, MAGNESIUM HYDROXIDE, AND SIMETHICONE 30 ML: 200; 200; 20 SUSPENSION ORAL at 14:53

## 2023-03-26 RX ADMIN — DEXAMETHASONE 8 MG: 4 TABLET ORAL at 15:43

## 2023-03-26 ASSESSMENT — PAIN SCALES - GENERAL: PAINLEVEL_OUTOF10: 8

## 2023-03-26 ASSESSMENT — PAIN DESCRIPTION - LOCATION: LOCATION: THROAT

## 2023-03-26 NOTE — DISCHARGE INSTRUCTIONS
Use \"Magic mouthwash\", 5 mL (1 teaspoon) every hour as needed for pain. Return if your condition worsens. Follow-up with your primary caregiver for recheck.

## 2023-03-26 NOTE — ED NOTES
Pt states her throat is very sore and makes it difficult to swallow. Pt is talkative with nurse and son at bedside. Pt cooperative and able to take the oral medication easily.       Adilia Mendoza RN  03/26/23 9115

## 2023-03-26 NOTE — ED PROVIDER NOTES
Emergency Department Encounter  Location: [place] [facility] [address] [place] [address]    Patient: Marycruz Sheth  MRN: [id number]  : 1945  Date of evaluation: 3/26/2023  ED Provider: Dylon Amaral DO, FACEP    Chief Complaint:    Pharyngitis (X 2 days )    [place]:  Marycruz Sheth is a 69 y.o. female that presents to the emergency department with complaints of pharyngitis. Patient states she started having sore throat couple days ago. States morning it was bad enough that she is having pain when she tries to eat or drink anything. She was able to take her morning meds. She denies fever or chills. She denies nausea vomiting or diarrhea. She states she has a cough but she is unable to cough anything up because of the pain in her throat. She has not taken anything for the pain in her throat. ROS:  At least 4 systems reviewed and otherwise acutely negative except as in the [address] Negative for fever or chills  Negative for chest pain  Negative for shortness of breath  Negative for nausea vomiting diarrhea or constipation    Past Medical History:   Diagnosis Date    Anxiety     per old chart hx of panic attacks    Atrial fibrillation (HCC)     Atrial fibrillation with normal ventricular rate ([facility] 2015    CAD (coronary artery disease)     follows with Dr Pedro Avitia, bilateral     COPD (chronic obstructive pulmonary disease) ([facility] [facility]     follows with Dr Jarocho Kerr    Depression     Diabetes mellitus [facility])     dx 1's per old chart    Hyperlipidemia     Hypertension     Liver cirrhosis ([facility]     Pleural effusion     scheduled for thoracentesis 2017  has had fluid drained off lung 4 times since CABG    Seizure [facility])     \"had a diabetic seizure 16 hrs ago- from low sugar\"    Thyroid disease     Wears dentures     full  upper plate    Wears glasses      Past Surgical History:   Procedure Laterality Date    ABDOMEN SURGERY      per old chart pt had exp. laparotomy and appy done \"the appendix was

## 2023-09-12 ENCOUNTER — HOSPITAL ENCOUNTER (EMERGENCY)
Age: 78
Discharge: HOME | End: 2023-09-12
Attending: EMERGENCY MEDICINE
Payer: MEDICARE

## 2023-09-12 VITALS
SYSTOLIC BLOOD PRESSURE: 142 MMHG | WEIGHT: 175.6 LBS | RESPIRATION RATE: 16 BRPM | HEIGHT: 66 IN | BODY MASS INDEX: 28.22 KG/M2 | TEMPERATURE: 98.3 F | HEART RATE: 84 BPM | DIASTOLIC BLOOD PRESSURE: 74 MMHG | OXYGEN SATURATION: 97 %

## 2023-09-12 DIAGNOSIS — J02.9 ACUTE PHARYNGITIS, UNSPECIFIED ETIOLOGY: Primary | ICD-10-CM

## 2023-09-12 PROCEDURE — 6360000002 HC RX W HCPCS: Performed by: EMERGENCY MEDICINE

## 2023-09-12 PROCEDURE — 87081 CULTURE SCREEN ONLY: CPT

## 2023-09-12 PROCEDURE — 99283 EMERGENCY DEPT VISIT LOW MDM: CPT

## 2023-09-12 PROCEDURE — 87430 STREP A AG IA: CPT

## 2023-09-12 PROCEDURE — 6370000000 HC RX 637 (ALT 250 FOR IP): Performed by: EMERGENCY MEDICINE

## 2023-09-12 RX ORDER — DEXAMETHASONE 4 MG/1
8 TABLET ORAL ONCE
Status: COMPLETED | OUTPATIENT
Start: 2023-09-12 | End: 2023-09-12

## 2023-09-12 RX ORDER — LIDOCAINE HYDROCHLORIDE 20 MG/ML
15 SOLUTION OROPHARYNGEAL ONCE
Status: DISCONTINUED | OUTPATIENT
Start: 2023-09-12 | End: 2023-09-12 | Stop reason: CLARIF

## 2023-09-12 RX ORDER — MAGNESIUM HYDROXIDE/ALUMINUM HYDROXICE/SIMETHICONE 120; 1200; 1200 MG/30ML; MG/30ML; MG/30ML
30 SUSPENSION ORAL ONCE
Status: DISCONTINUED | OUTPATIENT
Start: 2023-09-12 | End: 2023-09-12 | Stop reason: CLARIF

## 2023-09-12 RX ADMIN — DEXAMETHASONE 8 MG: 4 TABLET ORAL at 11:55

## 2023-09-12 RX ADMIN — DIPHENHYDRAMINE HCL 30 ML: 12.5 SOLUTION ORAL at 11:55

## 2023-09-12 ASSESSMENT — LIFESTYLE VARIABLES
HOW OFTEN DO YOU HAVE A DRINK CONTAINING ALCOHOL: NEVER
HOW MANY STANDARD DRINKS CONTAINING ALCOHOL DO YOU HAVE ON A TYPICAL DAY: PATIENT DOES NOT DRINK

## 2023-09-12 ASSESSMENT — PAIN DESCRIPTION - LOCATION: LOCATION: THROAT

## 2023-09-12 ASSESSMENT — PAIN DESCRIPTION - PAIN TYPE: TYPE: ACUTE PAIN

## 2023-09-12 ASSESSMENT — PAIN DESCRIPTION - ONSET: ONSET: SUDDEN

## 2023-09-12 ASSESSMENT — PAIN SCALES - GENERAL: PAINLEVEL_OUTOF10: 8

## 2023-09-12 ASSESSMENT — PAIN DESCRIPTION - DESCRIPTORS: DESCRIPTORS: SORE

## 2023-09-12 ASSESSMENT — PAIN - FUNCTIONAL ASSESSMENT: PAIN_FUNCTIONAL_ASSESSMENT: 0-10

## 2023-09-12 NOTE — ED NOTES
Discharge instructions reviewed with patient. Reviewed prescriptions with patient. No additional questions asked. Voiced understanding. Encouraged patient to follow up as discussed by the ED physician.       Curtis Johnson RN  09/12/23 9936

## 2023-09-12 NOTE — ED PROVIDER NOTES
Emergency Department Encounter  Location: [facility]    Patient: Earl De Dios  MRN: [id number]  : 1945  Date of evaluation: 2023  ED Provider: Renate Patel DO, FACEP    Chief Complaint:    Pharyngitis (Was hoarse lastnight and then woke up with a sore throat. Gargled salt water but didn't feel better.)  With complaints  [place]:  Earl De Dios is a 69 y.o. female that presents to the emergency department with complaints of pharyngitis that started around 430 this morning. She was little hoarse when she went to bed last night. She had strep back in March and she is concerned she has  it again. She is complaining of decreased ability to swallow. She is handling her secretions adequately. She denies fever. She states that her ears feel full. ROS:  At least 4 systems reviewed and otherwise acutely negative except as in the [address] Negative for fever or chills  Negative for chest pain  Negative for shortness of breath  Negative for nausea vomiting diarrhea or constipation    Past Medical History:   Diagnosis Date    Anxiety     per old chart hx of panic attacks    Atrial fibrillation (HCC)     Atrial fibrillation with normal ventricular rate ([address]) 2015    CAD (coronary artery disease)     follows with Dr Jenn Mace, bilateral     COPD (chronic obstructive pulmonary disease) ([address])     follows with Dr Destinee Rae    Depression     Diabetes mellitus [facility])     dx 1's per old chart    Hyperlipidemia     Hypertension     Liver cirrhosis ([address])     Pleural effusion     scheduled for thoracentesis 2017  has had fluid drained off lung 4 times since CABG    Seizure [facility])     \"had a diabetic seizure 16 hrs ago- from low sugar\"    Thyroid disease     Wears dentures     full  upper plate    Wears glasses      Past Surgical History:   Procedure Laterality Date    ABDOMEN SURGERY      per old chart pt had exp. laparotomy and appy done \"the appendix was wrapped around

## 2023-09-12 NOTE — DISCHARGE INSTRUCTIONS
Use Tylenol for pain. Use the Magic mouthwash, 1 teaspoon to swish and swallow as needed for pain. Use your Mucinex for congestion. Follow-up with your primary caregiver soon as possible.

## 2023-09-14 LAB
CULTURE: NORMAL
Lab: NORMAL
SPECIMEN: NORMAL
STREP A DIRECT SCREEN: NEGATIVE

## 2023-10-05 ENCOUNTER — HOSPITAL ENCOUNTER (OUTPATIENT)
Dept: MAMMOGRAPHY | Age: 78
Discharge: HOME | End: 2023-10-05
Attending: FAMILY MEDICINE
Payer: MEDICARE

## 2023-10-05 VITALS — BODY MASS INDEX: 28.28 KG/M2 | HEIGHT: 66 IN | WEIGHT: 176 LBS

## 2023-10-05 DIAGNOSIS — Z12.31 BREAST CANCER SCREENING BY MAMMOGRAM: ICD-10-CM

## 2023-10-05 PROCEDURE — 77063 BREAST TOMOSYNTHESIS BI: CPT

## 2024-07-28 ENCOUNTER — HOSPITAL ENCOUNTER (EMERGENCY)
Age: 79
Discharge: HOME | End: 2024-07-28
Attending: EMERGENCY MEDICINE
Payer: MEDICARE

## 2024-07-28 VITALS
TEMPERATURE: 97.8 F | RESPIRATION RATE: 18 BRPM | DIASTOLIC BLOOD PRESSURE: 66 MMHG | BODY MASS INDEX: 26.84 KG/M2 | HEIGHT: 66 IN | SYSTOLIC BLOOD PRESSURE: 137 MMHG | WEIGHT: 167 LBS | OXYGEN SATURATION: 96 % | HEART RATE: 68 BPM

## 2024-07-28 DIAGNOSIS — B37.2 CANDIDAL DERMATITIS: Primary | ICD-10-CM

## 2024-07-28 PROCEDURE — 99283 EMERGENCY DEPT VISIT LOW MDM: CPT

## 2024-07-28 RX ORDER — NYSTATIN 100000 [USP'U]/G
POWDER TOPICAL
Qty: 30 G | Refills: 0 | Status: SHIPPED | OUTPATIENT
Start: 2024-07-28

## 2024-07-28 ASSESSMENT — PAIN - FUNCTIONAL ASSESSMENT
PAIN_FUNCTIONAL_ASSESSMENT: 0-10
PAIN_FUNCTIONAL_ASSESSMENT: PREVENTS OR INTERFERES SOME ACTIVE ACTIVITIES AND ADLS

## 2024-07-28 ASSESSMENT — PAIN DESCRIPTION - DESCRIPTORS: DESCRIPTORS: ACHING;DISCOMFORT

## 2024-07-28 ASSESSMENT — PAIN DESCRIPTION - ORIENTATION: ORIENTATION: LEFT

## 2024-07-28 ASSESSMENT — PAIN DESCRIPTION - FREQUENCY: FREQUENCY: CONTINUOUS

## 2024-07-28 ASSESSMENT — PAIN DESCRIPTION - LOCATION: LOCATION: GROIN

## 2024-07-28 ASSESSMENT — PAIN SCALES - GENERAL: PAINLEVEL_OUTOF10: 4

## 2024-07-28 ASSESSMENT — PAIN DESCRIPTION - PAIN TYPE: TYPE: ACUTE PAIN

## 2024-07-28 NOTE — ED PROVIDER NOTES
Emergency Department Encounter  Location: [facility] EMERGENCY DEPARTMENT    Patient: Shana Patel  MRN: [id number]  : 1945  Date of evaluation: 2024  ED Provider: Arianne Diaz DO    Chief Complaint:    Groin Pain (C/o redness and pain to lt groin area for two weeks . Pt states she has had this before)    [place]:  Shana Patel is a 79 y.o. female that presents to the emergency department with concern for rash in her left inguinal region.  States it is somewhat itchy and just feels irritated.  She noticed it for 5 days ago.  States she has had a similar rash in the same area that was treated successfully with a powder.  She states she is otherwise been feeling well and denies abdominal pain or change in appetite.  No urinary complaints.  Reports she is a diabetic with glucose typically in the 130s.  Indicates she is compliant with her medications.      Past Medical History:   Diagnosis Date    Anxiety     per old chart hx of panic attacks    Atrial fibrillation (HCC)     Atrial fibrillation with normal ventricular rate (HCC) 2015    CAD (coronary artery disease)     follows with Dr Rafael Morelos    Cataracts, bilateral     COPD (chronic obstructive pulmonary disease) (HCC)     follows with Dr Noah Khanna     Diabetes mellitus (HCC)     dx  per old chart    Hyperlipidemia     Hypertension     Liver cirrhosis (HCC)     Pleural effusion     scheduled for thoracentesis 2017  has had fluid drained off lung 4 times since CABG    Seizure (HCC)     \"had a diabetic seizure 16 hrs ago- from low sugar\"    Thyroid disease     Wears dentures     full  upper plate    Wears glasses      Past Surgical History:   Procedure Laterality Date    ABDOMEN SURGERY      per old chart pt had exp.laparotomy and appy done \"the appendix was wrapped around the colon\"    ABDOMINAL AORTIC ANEURYSM REPAIR          APPENDECTOMY  1968    CARDIAC CATHETERIZATION  2017    Dr. Carolyn Morelos.

## 2024-10-09 ENCOUNTER — APPOINTMENT (OUTPATIENT)
Dept: GENERAL RADIOLOGY | Age: 79
End: 2024-10-09
Attending: STUDENT IN AN ORGANIZED HEALTH CARE EDUCATION/TRAINING PROGRAM
Payer: MEDICARE

## 2024-10-09 ENCOUNTER — HOSPITAL ENCOUNTER (EMERGENCY)
Age: 79
Discharge: LEFT BEFORE DISCHARGE | End: 2024-10-09
Attending: STUDENT IN AN ORGANIZED HEALTH CARE EDUCATION/TRAINING PROGRAM
Payer: MEDICARE

## 2024-10-09 VITALS
DIASTOLIC BLOOD PRESSURE: 70 MMHG | SYSTOLIC BLOOD PRESSURE: 136 MMHG | WEIGHT: 168 LBS | TEMPERATURE: 98.4 F | RESPIRATION RATE: 18 BRPM | OXYGEN SATURATION: 92 % | HEIGHT: 66 IN | BODY MASS INDEX: 27 KG/M2 | HEART RATE: 63 BPM

## 2024-10-09 DIAGNOSIS — R79.89 ELEVATED TROPONIN: Primary | ICD-10-CM

## 2024-10-09 DIAGNOSIS — R06.02 SHORTNESS OF BREATH: ICD-10-CM

## 2024-10-09 LAB
ANION GAP SERPL CALCULATED.3IONS-SCNC: 16 MMOL/L (ref 4–16)
BASOPHILS # BLD: 0.05 K/UL
BASOPHILS NFR BLD: 0 % (ref 0–1)
BNP SERPL-MCNC: 83 PG/ML (ref 0–450)
BUN SERPL-MCNC: 15 MG/DL (ref 6–23)
CALCIUM SERPL-MCNC: 9.4 MG/DL (ref 8.3–10.6)
CHLORIDE SERPL-SCNC: 95 MMOL/L (ref 99–110)
CO2 SERPL-SCNC: 22 MMOL/L (ref 21–32)
CREAT SERPL-MCNC: 0.7 MG/DL (ref 0.6–1.1)
D DIMER PPP FEU-MCNC: 2.4 UG/ML FEU (ref 0–0.46)
EOSINOPHIL # BLD: 0.04 K/UL
EOSINOPHILS RELATIVE PERCENT: 0 % (ref 0–3)
ERYTHROCYTE [DISTWIDTH] IN BLOOD BY AUTOMATED COUNT: 13.6 % (ref 11.7–14.9)
GFR, ESTIMATED: 88 ML/MIN/1.73M2
GLUCOSE SERPL-MCNC: 234 MG/DL (ref 70–99)
HCT VFR BLD AUTO: 43.8 % (ref 37–47)
HGB BLD-MCNC: 14.2 G/DL (ref 12.5–16)
IMM GRANULOCYTES # BLD AUTO: 0.12 K/UL
IMM GRANULOCYTES NFR BLD: 1 %
LYMPHOCYTES NFR BLD: 0.62 K/UL
LYMPHOCYTES RELATIVE PERCENT: 5 % (ref 24–44)
MCH RBC QN AUTO: 31.6 PG (ref 27–31)
MCHC RBC AUTO-ENTMCNC: 32.4 G/DL (ref 32–36)
MCV RBC AUTO: 97.3 FL (ref 78–100)
MONOCYTES NFR BLD: 0.98 K/UL
MONOCYTES NFR BLD: 7 % (ref 0–4)
NEUTROPHILS NFR BLD: 87 % (ref 36–66)
NEUTS SEG NFR BLD: 11.87 K/UL
PLATELET # BLD AUTO: 154 K/UL (ref 140–440)
PMV BLD AUTO: 11.7 FL (ref 7.5–11.1)
POTASSIUM SERPL-SCNC: 3.8 MMOL/L (ref 3.5–5.1)
RBC # BLD AUTO: 4.5 M/UL (ref 4.2–5.4)
SODIUM SERPL-SCNC: 133 MMOL/L (ref 135–145)
TROPONIN I SERPL HS-MCNC: 17 NG/L (ref 0–13)
TROPONIN I SERPL HS-MCNC: 18 NG/L (ref 0–13)
WBC OTHER # BLD: 13.7 K/UL (ref 4–10.5)

## 2024-10-09 PROCEDURE — 83880 ASSAY OF NATRIURETIC PEPTIDE: CPT

## 2024-10-09 PROCEDURE — 71045 X-RAY EXAM CHEST 1 VIEW: CPT

## 2024-10-09 PROCEDURE — 85379 FIBRIN DEGRADATION QUANT: CPT

## 2024-10-09 PROCEDURE — 85025 COMPLETE CBC W/AUTO DIFF WBC: CPT

## 2024-10-09 PROCEDURE — 93005 ELECTROCARDIOGRAM TRACING: CPT | Performed by: STUDENT IN AN ORGANIZED HEALTH CARE EDUCATION/TRAINING PROGRAM

## 2024-10-09 PROCEDURE — 80048 BASIC METABOLIC PNL TOTAL CA: CPT

## 2024-10-09 PROCEDURE — 99285 EMERGENCY DEPT VISIT HI MDM: CPT

## 2024-10-09 PROCEDURE — 96374 THER/PROPH/DIAG INJ IV PUSH: CPT

## 2024-10-09 PROCEDURE — 84484 ASSAY OF TROPONIN QUANT: CPT

## 2024-10-09 PROCEDURE — 6360000002 HC RX W HCPCS: Performed by: STUDENT IN AN ORGANIZED HEALTH CARE EDUCATION/TRAINING PROGRAM

## 2024-10-09 RX ORDER — ONDANSETRON 2 MG/ML
4 INJECTION INTRAMUSCULAR; INTRAVENOUS ONCE
Status: COMPLETED | OUTPATIENT
Start: 2024-10-09 | End: 2024-10-09

## 2024-10-09 RX ADMIN — ONDANSETRON 4 MG: 2 INJECTION, SOLUTION INTRAMUSCULAR; INTRAVENOUS at 18:34

## 2024-10-09 ASSESSMENT — PAIN SCALES - GENERAL
PAINLEVEL_OUTOF10: 0
PAINLEVEL_OUTOF10: 7

## 2024-10-09 ASSESSMENT — PAIN - FUNCTIONAL ASSESSMENT
PAIN_FUNCTIONAL_ASSESSMENT: 0-10
PAIN_FUNCTIONAL_ASSESSMENT: 0-10

## 2024-10-09 ASSESSMENT — PAIN DESCRIPTION - LOCATION: LOCATION: BACK

## 2024-10-09 NOTE — ED PROVIDER NOTES
Patient care was assumed from Dr. Dias at the end of the shift.  Briefly this patient is a 79-year-old who came in the emergency department stating he just did not feel well and was having shortness of breath.  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  However she does not admit to history of asthma or COPD although she states she quit smoking 20 years ago.  Patient has been worked up for ACS.  He is get a minimal troponin leak initially 18 and repeat troponin the same at 17.  D-dimer however, is elevated at 2.4.  CTPA obtained.  CBC reveals elevated white count over 13,000 with left shift.  Basic chemistry reveals slight hypontremia sodium 133 elevated glucose 234 but normal bicarb and anion gap.  BNP is normal at 83.  I was called to the patient's room by the ED nurse caring for the patient.  Patient refused to get the CTPA.  She says she is tired and wants to go home.  Both myself and the ED nurse explained to the patient that the likelihood of having a significant PE is very high in her presentation especially when considering initially her having low oxygen in the 80s until she was placed on oxygen.  Her oxygen does improve and was 93% on room air while she is sitting in bed.  But despite that I told the patient that she could have a PE which is potentially life-threatening and can cause cardiac arrest or severe hypotension and respiratory failure which which are both life-threatening.  She flatly refused to stay and wait for the CT PA to be done.  She left the emergency department after signing informed refusal forms     Fish Bui MD  10/09/24 2008    
mmol/L    Glucose 234 (H) 70 - 99 mg/dL    BUN 15 6 - 23 mg/dL    Creatinine 0.7 0.6 - 1.1 mg/dL    Est, Glom Filt Rate 88 >60 mL/min/1.73m2    Calcium 9.4 8.3 - 10.6 mg/dL   CBC with Auto Differential   Result Value Ref Range    WBC 13.7 (H) 4.0 - 10.5 k/uL    RBC 4.50 4.20 - 5.40 m/uL    Hemoglobin 14.2 12.5 - 16.0 g/dL    Hematocrit 43.8 37.0 - 47.0 %    MCV 97.3 78.0 - 100.0 fL    MCH 31.6 (H) 27.0 - 31.0 pg    MCHC 32.4 32.0 - 36.0 g/dL    RDW 13.6 11.7 - 14.9 %    Platelets 154 140 - 440 k/uL    MPV 11.7 (H) 7.5 - 11.1 fL    Neutrophils % 87 (H) 36 - 66 %    Lymphocytes % 5 (L) 24 - 44 %    Monocytes % 7 (H) 0 - 4 %    Eosinophils % 0 0 - 3 %    Basophils % 0 0 - 1 %    Immature Granulocytes % 1 (H) 0 %    Neutrophils Absolute 11.87 k/uL    Lymphocytes Absolute 0.62 k/uL    Monocytes Absolute 0.98 k/uL    Eosinophils Absolute 0.04 k/uL    Basophils Absolute 0.05 k/uL    Immature Granulocytes Absolute 0.12 k/uL   Troponin Now and Q 1 Hour   Result Value Ref Range    Troponin, High Sensitivity 18 (H) 0 - 13 ng/L   EKG 12 Lead   Result Value Ref Range    Ventricular Rate 62 BPM    Atrial Rate 62 BPM    P-R Interval 222 ms    QRS Duration 72 ms    Q-T Interval 480 ms    QTc Calculation (Bazett) 487 ms    P Axis 5 degrees    R Axis 52 degrees    T Axis -35 degrees    Diagnosis       Atrial-paced rhythm with prolonged AV conduction  ST & T wave abnormality, consider inferior ischemia  ST & T wave abnormality, consider anterior ischemia  Abnormal ECG  When compared with ECG of 30-NOV-2022 09:59,  Electronic atrial pacemaker has replaced Atrial flutter  Vent. rate has decreased BY  61 BPM  Inverted T waves have replaced nonspecific T wave abnormality in Anterior leads           Radiologic Studies:   If obtained, pertinent radiology studies and findings discussed and MDM.    Medical Decision Making     Patient was triaged by nursing staff and I reviewed vital signs.  Available medical records were reviewed including

## 2024-10-10 LAB
EKG ATRIAL RATE: 62 BPM
EKG DIAGNOSIS: NORMAL
EKG P AXIS: 5 DEGREES
EKG P-R INTERVAL: 222 MS
EKG Q-T INTERVAL: 480 MS
EKG QRS DURATION: 72 MS
EKG QTC CALCULATION (BAZETT): 487 MS
EKG R AXIS: 52 DEGREES
EKG T AXIS: -35 DEGREES
EKG VENTRICULAR RATE: 62 BPM

## 2024-10-10 PROCEDURE — 93010 ELECTROCARDIOGRAM REPORT: CPT | Performed by: INTERNAL MEDICINE

## 2024-10-10 NOTE — ED NOTES
Patient left the ED with a disposition of AMA after speaking to Dr Bui. Patient cited wait time as reason. Pt verbalized understanding of risks including death and she is still okay with going home.  Advised patient to follow up with a primary care physician or return to the Emergency Department if symptoms worsen.

## 2024-10-12 ENCOUNTER — APPOINTMENT (OUTPATIENT)
Dept: CT IMAGING | Age: 79
End: 2024-10-12
Payer: MEDICARE

## 2024-10-12 ENCOUNTER — HOSPITAL ENCOUNTER (EMERGENCY)
Age: 79
Discharge: ANOTHER HOSPITAL | End: 2024-10-12
Payer: MEDICARE

## 2024-10-12 VITALS
OXYGEN SATURATION: 94 % | SYSTOLIC BLOOD PRESSURE: 110 MMHG | TEMPERATURE: 97.5 F | HEIGHT: 65 IN | HEART RATE: 116 BPM | DIASTOLIC BLOOD PRESSURE: 58 MMHG | RESPIRATION RATE: 20 BRPM | BODY MASS INDEX: 27.99 KG/M2 | WEIGHT: 168 LBS

## 2024-10-12 DIAGNOSIS — N76.82: Primary | ICD-10-CM

## 2024-10-12 LAB
ABSOLUTE BANDS: 0.14 K/UL
ALBUMIN SERPL-MCNC: 2.8 G/DL (ref 3.4–5)
ALBUMIN/GLOB SERPL: 0.9 {RATIO} (ref 1.1–2.2)
ALP SERPL-CCNC: 157 U/L (ref 40–129)
ALT SERPL-CCNC: 15 U/L (ref 10–40)
AMMONIA PLAS-SCNC: 93 UMOL/L (ref 11–51)
ANION GAP SERPL CALCULATED.3IONS-SCNC: 18 MMOL/L (ref 4–16)
AST SERPL-CCNC: 24 U/L (ref 15–37)
BACTERIA URNS QL MICRO: ABNORMAL
BANDS: 1 %
BASOPHILS # BLD: 0 K/UL
BASOPHILS NFR BLD: 0 % (ref 0–1)
BILIRUB SERPL-MCNC: 2 MG/DL (ref 0–1)
BILIRUB UR QL STRIP: ABNORMAL
BUN SERPL-MCNC: 40 MG/DL (ref 6–23)
CALCIUM SERPL-MCNC: 9.9 MG/DL (ref 8.3–10.6)
CASTS #/AREA URNS LPF: ABNORMAL /LPF
CASTS #/AREA URNS LPF: ABNORMAL /LPF
CHLORIDE SERPL-SCNC: 94 MMOL/L (ref 99–110)
CLARITY UR: CLEAR
CO2 SERPL-SCNC: 20 MMOL/L (ref 21–32)
COLOR UR: YELLOW
CREAT SERPL-MCNC: 0.8 MG/DL (ref 0.6–1.1)
EOSINOPHIL # BLD: 0 K/UL
EOSINOPHILS RELATIVE PERCENT: 0 % (ref 0–3)
EPI CELLS #/AREA URNS HPF: ABNORMAL /HPF
ERYTHROCYTE [DISTWIDTH] IN BLOOD BY AUTOMATED COUNT: 13.8 % (ref 11.7–14.9)
GFR, ESTIMATED: 75 ML/MIN/1.73M2
GLUCOSE SERPL-MCNC: 272 MG/DL (ref 70–99)
GLUCOSE UR STRIP-MCNC: NEGATIVE MG/DL
HCT VFR BLD AUTO: 46.3 % (ref 37–47)
HGB BLD-MCNC: 15.5 G/DL (ref 12.5–16)
HGB UR QL STRIP.AUTO: NEGATIVE
KETONES UR STRIP-MCNC: NEGATIVE MG/DL
LACTATE BLDV-SCNC: 4.5 MMOL/L (ref 0.4–2)
LEUKOCYTE ESTERASE UR QL STRIP: NEGATIVE
LYMPHOCYTES NFR BLD: 0.41 K/UL
LYMPHOCYTES RELATIVE PERCENT: 3 % (ref 24–44)
MCH RBC QN AUTO: 32.2 PG (ref 27–31)
MCHC RBC AUTO-ENTMCNC: 33.5 G/DL (ref 32–36)
MCV RBC AUTO: 96.3 FL (ref 78–100)
MONOCYTES NFR BLD: 1.35 K/UL
MONOCYTES NFR BLD: 10 % (ref 0–4)
NEUTROPHILS NFR BLD: 86 % (ref 36–66)
NEUTS SEG NFR BLD: 11.61 K/UL
NITRITE UR QL STRIP: NEGATIVE
PH UR STRIP: 6 [PH] (ref 5–8)
PLATELET # BLD AUTO: 215 K/UL (ref 140–440)
PLATELET ESTIMATE: NORMAL
PMV BLD AUTO: 11.7 FL (ref 7.5–11.1)
POTASSIUM SERPL-SCNC: 4.2 MMOL/L (ref 3.5–5.1)
PROT SERPL-MCNC: 6 G/DL (ref 6.4–8.2)
PROT UR STRIP-MCNC: 30 MG/DL
RBC # BLD AUTO: 4.81 M/UL (ref 4.2–5.4)
RBC # BLD: ABNORMAL 10*6/UL
RBC # BLD: ABNORMAL 10*6/UL
RBC #/AREA URNS HPF: ABNORMAL /HPF
SODIUM SERPL-SCNC: 132 MMOL/L (ref 135–145)
SP GR UR STRIP: 1.02 (ref 1–1.03)
TROPONIN I SERPL HS-MCNC: 21 NG/L (ref 0–13)
UROBILINOGEN UR STRIP-ACNC: 4 EU/DL (ref 0–1)
WBC # BLD: NORMAL 10*3/UL
WBC #/AREA URNS HPF: ABNORMAL /HPF
WBC OTHER # BLD: 13.5 K/UL (ref 4–10.5)

## 2024-10-12 PROCEDURE — 81001 URINALYSIS AUTO W/SCOPE: CPT

## 2024-10-12 PROCEDURE — 99285 EMERGENCY DEPT VISIT HI MDM: CPT

## 2024-10-12 PROCEDURE — 6370000000 HC RX 637 (ALT 250 FOR IP)

## 2024-10-12 PROCEDURE — 93005 ELECTROCARDIOGRAM TRACING: CPT

## 2024-10-12 PROCEDURE — 85025 COMPLETE CBC W/AUTO DIFF WBC: CPT

## 2024-10-12 PROCEDURE — 71275 CT ANGIOGRAPHY CHEST: CPT

## 2024-10-12 PROCEDURE — 96366 THER/PROPH/DIAG IV INF ADDON: CPT

## 2024-10-12 PROCEDURE — 82140 ASSAY OF AMMONIA: CPT

## 2024-10-12 PROCEDURE — 74177 CT ABD & PELVIS W/CONTRAST: CPT

## 2024-10-12 PROCEDURE — 87040 BLOOD CULTURE FOR BACTERIA: CPT

## 2024-10-12 PROCEDURE — 83605 ASSAY OF LACTIC ACID: CPT

## 2024-10-12 PROCEDURE — 96365 THER/PROPH/DIAG IV INF INIT: CPT

## 2024-10-12 PROCEDURE — 6360000004 HC RX CONTRAST MEDICATION

## 2024-10-12 PROCEDURE — 6360000002 HC RX W HCPCS

## 2024-10-12 PROCEDURE — 96375 TX/PRO/DX INJ NEW DRUG ADDON: CPT

## 2024-10-12 PROCEDURE — 87186 SC STD MICRODIL/AGAR DIL: CPT

## 2024-10-12 PROCEDURE — 96368 THER/DIAG CONCURRENT INF: CPT

## 2024-10-12 PROCEDURE — 2580000003 HC RX 258

## 2024-10-12 PROCEDURE — 80053 COMPREHEN METABOLIC PANEL: CPT

## 2024-10-12 PROCEDURE — 84484 ASSAY OF TROPONIN QUANT: CPT

## 2024-10-12 RX ORDER — 0.9 % SODIUM CHLORIDE 0.9 %
30 INTRAVENOUS SOLUTION INTRAVENOUS ONCE
Status: COMPLETED | OUTPATIENT
Start: 2024-10-12 | End: 2024-10-12

## 2024-10-12 RX ORDER — SODIUM CHLORIDE 9 MG/ML
50 INJECTION, SOLUTION INTRAVENOUS ONCE
Status: COMPLETED | OUTPATIENT
Start: 2024-10-12 | End: 2024-10-12

## 2024-10-12 RX ORDER — VANCOMYCIN 2 G/400ML
2000 INJECTION, SOLUTION INTRAVENOUS ONCE
Status: COMPLETED | OUTPATIENT
Start: 2024-10-12 | End: 2024-10-12

## 2024-10-12 RX ORDER — CLINDAMYCIN HCL 150 MG
600 CAPSULE ORAL ONCE
Status: COMPLETED | OUTPATIENT
Start: 2024-10-12 | End: 2024-10-12

## 2024-10-12 RX ORDER — IOPAMIDOL 755 MG/ML
100 INJECTION, SOLUTION INTRAVASCULAR
Status: COMPLETED | OUTPATIENT
Start: 2024-10-12 | End: 2024-10-12

## 2024-10-12 RX ORDER — ESMOLOL HYDROCHLORIDE 10 MG/ML
25-300 INJECTION, SOLUTION INTRAVENOUS CONTINUOUS
Status: DISCONTINUED | OUTPATIENT
Start: 2024-10-12 | End: 2024-10-13 | Stop reason: HOSPADM

## 2024-10-12 RX ADMIN — CLINDAMYCIN HYDROCHLORIDE 600 MG: 150 CAPSULE ORAL at 21:30

## 2024-10-12 RX ADMIN — PIPERACILLIN AND TAZOBACTAM 3375 MG: 3; .375 INJECTION, POWDER, FOR SOLUTION INTRAVENOUS at 19:42

## 2024-10-12 RX ADMIN — VANCOMYCIN 2000 MG: 2 INJECTION, SOLUTION INTRAVENOUS at 19:46

## 2024-10-12 RX ADMIN — SODIUM CHLORIDE 50 ML: 9 INJECTION, SOLUTION INTRAVENOUS at 21:49

## 2024-10-12 RX ADMIN — IOPAMIDOL 100 ML: 755 INJECTION, SOLUTION INTRAVENOUS at 20:59

## 2024-10-12 RX ADMIN — ESMOLOL HYDROCHLORIDE 50 MCG/KG/MIN: 10 INJECTION INTRAVENOUS at 21:42

## 2024-10-12 RX ADMIN — PROTHROMBIN COMPLEX CONCENTRATE (HUMAN) 2000 UNITS: 25.5; 16.5; 24; 22; 22; 26 POWDER, FOR SOLUTION INTRAVENOUS at 21:48

## 2024-10-12 RX ADMIN — SODIUM CHLORIDE 2286 ML: 9 INJECTION, SOLUTION INTRAVENOUS at 19:37

## 2024-10-12 ASSESSMENT — PAIN - FUNCTIONAL ASSESSMENT: PAIN_FUNCTIONAL_ASSESSMENT: NONE - DENIES PAIN

## 2024-10-12 NOTE — ED PROVIDER NOTES
NEGATIVE    Leukocyte Esterase, Urine NEGATIVE NEGATIVE   Troponin   Result Value Ref Range    Troponin, High Sensitivity 21 (H) 0 - 13 ng/L   Microscopic Urinalysis   Result Value Ref Range    WBC, UA 0 TO 5 0 TO 5 /HPF    RBC, UA 0 TO 2 0 TO 2 /HPF    Casts UA 0 TO 2 HYALINE /LPF    Casts UA 0 TO 2 FINE GRANULAR /LPF    Epithelial Cells, UA 0 TO 5 0 TO 5 /HPF    Bacteria, UA MANY (A) None   EKG 12 Lead   Result Value Ref Range    Ventricular Rate 109 BPM    Atrial Rate 115 BPM    QRS Duration 84 ms    Q-T Interval 380 ms    QTc Calculation (Bazett) 511 ms    R Axis 91 degrees    T Axis -49 degrees    Diagnosis       Atrial fibrillation with rapid ventricular response  Rightward axis  ST & T wave abnormality, consider inferior ischemia  ST & T wave abnormality, consider anterolateral ischemia  Abnormal ECG  When compared with ECG of 09-OCT-2024 18:17,  Atrial fibrillation has replaced Electronic atrial pacemaker  Vent. rate has increased BY  47 BPM  Inverted T waves have replaced nonspecific T wave abnormality in Lateral leads         Radiographs (if obtained):  [] The following radiograph was interpreted by myself in the absence of a radiologist:  [x] Radiologist's Report reviewed at time of ED visit:  CT ABDOMEN PELVIS W IV CONTRAST Additional Contrast? None   Final Result   1. Large region of subcutaneous stranding and subcutaneous gas involving the   right inguinal canal, right lower pelvic wall/pelvic pannus, anterior pelvic   wall/pelvic pannus, central and right lateral lower abdominal wall and mid right   abdominal wall. Consistent with gas-forming infection.   2. Diffuse fatty infiltration of the liver and hepatic cirrhosis.   3. 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 Status post aortoiliac bypass graft.   4. Madden catheter within the urinary bladder.   5. Diverticulosis coli without evidence diverticulitis.   6. Status post cholecystectomy     (ISOVUE-370) 76 % injection 100 mL (100 mLs IntraVENous Given 10/12/24 2059)   clindamycin (CLEOCIN) capsule 600 mg (600 mg Oral Given 10/12/24 2130)   prothrombin complex human-lans (BALFAXAR) infusion 2,000 Units (0 Units IntraVENous Stopped 10/12/24 2158)     Followed by   0.9 % sodium chloride infusion (0 mLs IntraVENous Patient Transferred to Other Facility 10/12/24 2214)   Records Reviewed : Source review of recent ER visit and patient refused CAT scan and signed out AGAINST MEDICAL ADVICE.    Disposition Considerations (tests considered but not done, Shared Decision Making, Pt Expectation of Test or Tx.):   Patient came back from CAT scan and I was alerted that there is free gas in the subcutaneous tissue on CAT scan and possibly dissection.  Reviewed CAT scan and I do see area of large amount of subcutaneous air to this pubis region going down into labia and to right ASIS.  Consistent with Anirudh's gangrene.  She also has area of radiopaque crescent shape in aorta concern for calcification with false lumen, overall aorta is very tortuous and I am concerned for dissection as well.  Given possible dissection and gas-forming infection decision was made to transfer patient to higher level of care.  I went back to room to reevaluate patient and patient's son was there, discussed diagnosis and need to transfer and they are agreeable to this treatment plan.  I will also order clindamycin.    Called and spoke to Dr. Tompkins, [facility], will accept as transfer to ER.  Will start esmolol as she is tachycardic in the 1 teens.  I will also reverse her anticoagulation, she is on Eliquis.    I spent at least 35 minutes of critical care time with this patient. This does not include time spent on separately reported billable procedures. Critical care time provided for Anirudh gangrene  that required close evaluation and/or intervention with concern for patient decompensation.    After patient transfer was initiated and

## 2024-10-13 LAB
EKG ATRIAL RATE: 115 BPM
EKG DIAGNOSIS: NORMAL
EKG Q-T INTERVAL: 380 MS
EKG QRS DURATION: 84 MS
EKG QTC CALCULATION (BAZETT): 511 MS
EKG R AXIS: 91 DEGREES
EKG T AXIS: -49 DEGREES
EKG VENTRICULAR RATE: 109 BPM

## 2024-10-13 PROCEDURE — 93010 ELECTROCARDIOGRAM REPORT: CPT | Performed by: INTERNAL MEDICINE

## 2024-10-13 NOTE — ED NOTES
Report called to [facility], [facility] receiving telephone report from this RN.  Flight crew here to transport patient.

## 2024-10-18 LAB
ACB COMPLEX DNA BLD POS QL NAA+NON-PROBE: NOT DETECTED
B FRAGILIS DNA BLD POS QL NAA+NON-PROBE: NOT DETECTED
BLACTX-M ISLT/SPM QL: NOT DETECTED
BLAIMP ISLT/SPM QL: NOT DETECTED
BLAKPC ISLT/SPM QL: NOT DETECTED
BLAOXA-48-LIKE ISLT/SPM QL: NOT DETECTED
BLAVIM ISLT/SPM QL: NOT DETECTED
C ALBICANS DNA BLD POS QL NAA+NON-PROBE: NOT DETECTED
C AURIS DNA BLD POS QL NAA+NON-PROBE: NOT DETECTED
C GATTII+NEOFOR DNA BLD POS QL NAA+N-PRB: NOT DETECTED
C GLABRATA DNA BLD POS QL NAA+NON-PROBE: NOT DETECTED
C KRUSEI DNA BLD POS QL NAA+NON-PROBE: NOT DETECTED
C PARAP DNA BLD POS QL NAA+NON-PROBE: NOT DETECTED
C TROPICLS DNA BLD POS QL NAA+NON-PROBE: NOT DETECTED
E CLOAC COMP DNA BLD POS NAA+NON-PROBE: NOT DETECTED
E COLI DNA BLD POS QL NAA+NON-PROBE: NOT DETECTED
E FAECALIS DNA BLD POS QL NAA+NON-PROBE: NOT DETECTED
E FAECIUM DNA BLD POS QL NAA+NON-PROBE: NOT DETECTED
ENTEROBACTERALES DNA BLD POS NAA+N-PRB: DETECTED
GP B STREP DNA BLD POS QL NAA+NON-PROBE: NOT DETECTED
HAEM INFLU DNA BLD POS QL NAA+NON-PROBE: NOT DETECTED
K OXYTOCA DNA BLD POS QL NAA+NON-PROBE: NOT DETECTED
KLEBSIELLA SP DNA BLD POS QL NAA+NON-PRB: NOT DETECTED
KLEBSIELLA SP DNA BLD POS QL NAA+NON-PRB: NOT DETECTED
L MONOCYTOG DNA BLD POS QL NAA+NON-PROBE: NOT DETECTED
MICROORGANISM SPEC CULT: ABNORMAL
MICROORGANISM/AGENT SPEC: ABNORMAL
N MEN DNA BLD POS QL NAA+NON-PROBE: NOT DETECTED
P AERUGINOSA DNA BLD POS NAA+NON-PROBE: NOT DETECTED
PROTEUS SP DNA BLD POS QL NAA+NON-PROBE: DETECTED
RESISTANT GENE NDM BY PCR: NOT DETECTED
S AUREUS DNA BLD POS QL NAA+NON-PROBE: NOT DETECTED
S AUREUS+CONS DNA BLD POS NAA+NON-PROBE: NOT DETECTED
S EPIDERMIDIS DNA BLD POS QL NAA+NON-PRB: NOT DETECTED
S LUGDUNENSIS DNA BLD POS QL NAA+NON-PRB: NOT DETECTED
S MALTOPHILIA DNA BLD POS QL NAA+NON-PRB: NOT DETECTED
S MARCESCENS DNA BLD POS NAA+NON-PROBE: NOT DETECTED
S PNEUM DNA BLD POS QL NAA+NON-PROBE: NOT DETECTED
S PYO DNA BLD POS QL NAA+NON-PROBE: ABNORMAL
SALMONELLA DNA BLD POS QL NAA+NON-PROBE: NOT DETECTED
SERVICE CMNT-IMP: ABNORMAL
SPECIMEN DESCRIPTION: ABNORMAL
STREPTOCOCCUS DNA BLD POS NAA+NON-PROBE: NOT DETECTED

## 2024-10-19 LAB
MICROORGANISM SPEC CULT: NORMAL
SERVICE CMNT-IMP: NORMAL
SPECIMEN DESCRIPTION: NORMAL